# Patient Record
Sex: FEMALE | Race: WHITE | NOT HISPANIC OR LATINO | URBAN - NONMETROPOLITAN AREA
[De-identification: names, ages, dates, MRNs, and addresses within clinical notes are randomized per-mention and may not be internally consistent; named-entity substitution may affect disease eponyms.]

---

## 2018-04-30 ENCOUNTER — OFFICE VISIT CONVERTED (OUTPATIENT)
Dept: FAMILY MEDICINE CLINIC | Facility: CLINIC | Age: 28
End: 2018-04-30
Attending: NURSE PRACTITIONER

## 2020-01-02 ENCOUNTER — HOSPITAL ENCOUNTER (OUTPATIENT)
Dept: URGENT CARE | Facility: CLINIC | Age: 30
Discharge: HOME | End: 2020-01-02
Attending: EMERGENCY MEDICINE

## 2020-12-26 ENCOUNTER — HOSPITAL ENCOUNTER (OUTPATIENT)
Dept: URGENT CARE | Facility: CLINIC | Age: 30
Discharge: HOME | End: 2020-12-26

## 2020-12-27 LAB — SARS-COV-2 RNA SPEC QL NAA+PROBE: NOT DETECTED

## 2021-05-16 VITALS
RESPIRATION RATE: 18 BRPM | TEMPERATURE: 97.8 F | SYSTOLIC BLOOD PRESSURE: 116 MMHG | OXYGEN SATURATION: 98 % | WEIGHT: 196.44 LBS | BODY MASS INDEX: 29.09 KG/M2 | DIASTOLIC BLOOD PRESSURE: 68 MMHG | HEART RATE: 80 BPM | HEIGHT: 69 IN

## 2023-05-16 PROCEDURE — 87205 SMEAR GRAM STAIN: CPT | Performed by: STUDENT IN AN ORGANIZED HEALTH CARE EDUCATION/TRAINING PROGRAM

## 2023-05-16 PROCEDURE — 87070 CULTURE OTHR SPECIMN AEROBIC: CPT | Performed by: STUDENT IN AN ORGANIZED HEALTH CARE EDUCATION/TRAINING PROGRAM

## 2023-05-16 PROCEDURE — 87147 CULTURE TYPE IMMUNOLOGIC: CPT | Performed by: STUDENT IN AN ORGANIZED HEALTH CARE EDUCATION/TRAINING PROGRAM

## 2023-05-16 PROCEDURE — 87186 SC STD MICRODIL/AGAR DIL: CPT | Performed by: STUDENT IN AN ORGANIZED HEALTH CARE EDUCATION/TRAINING PROGRAM

## 2023-05-18 ENCOUNTER — TELEPHONE (OUTPATIENT)
Dept: URGENT CARE | Facility: CLINIC | Age: 33
End: 2023-05-18
Payer: COMMERCIAL

## 2023-05-22 ENCOUNTER — LAB (OUTPATIENT)
Dept: LAB | Facility: HOSPITAL | Age: 33
End: 2023-05-22
Payer: COMMERCIAL

## 2023-05-22 ENCOUNTER — OFFICE VISIT (OUTPATIENT)
Dept: FAMILY MEDICINE CLINIC | Facility: CLINIC | Age: 33
End: 2023-05-22
Payer: COMMERCIAL

## 2023-05-22 VITALS
WEIGHT: 195.2 LBS | BODY MASS INDEX: 28.91 KG/M2 | TEMPERATURE: 98.7 F | HEART RATE: 89 BPM | SYSTOLIC BLOOD PRESSURE: 137 MMHG | DIASTOLIC BLOOD PRESSURE: 84 MMHG | HEIGHT: 69 IN | OXYGEN SATURATION: 98 %

## 2023-05-22 DIAGNOSIS — M25.512 ACUTE PAIN OF LEFT SHOULDER: ICD-10-CM

## 2023-05-22 DIAGNOSIS — Z13.220 SCREENING FOR LIPID DISORDERS: ICD-10-CM

## 2023-05-22 DIAGNOSIS — Z13.29 SCREENING FOR THYROID DISORDER: ICD-10-CM

## 2023-05-22 DIAGNOSIS — F41.1 GAD (GENERALIZED ANXIETY DISORDER): ICD-10-CM

## 2023-05-22 DIAGNOSIS — E55.9 VITAMIN D DEFICIENCY: ICD-10-CM

## 2023-05-22 DIAGNOSIS — K21.9 GASTROESOPHAGEAL REFLUX DISEASE, UNSPECIFIED WHETHER ESOPHAGITIS PRESENT: ICD-10-CM

## 2023-05-22 DIAGNOSIS — Z22.322 MRSA (METHICILLIN RESISTANT STAPH AUREUS) CULTURE POSITIVE: ICD-10-CM

## 2023-05-22 DIAGNOSIS — F32.A DEPRESSION, UNSPECIFIED DEPRESSION TYPE: ICD-10-CM

## 2023-05-22 DIAGNOSIS — M19.90 ARTHRITIS: ICD-10-CM

## 2023-05-22 DIAGNOSIS — J30.9 ALLERGIC RHINITIS, UNSPECIFIED SEASONALITY, UNSPECIFIED TRIGGER: ICD-10-CM

## 2023-05-22 DIAGNOSIS — Z76.89 ESTABLISHING CARE WITH NEW DOCTOR, ENCOUNTER FOR: Primary | ICD-10-CM

## 2023-05-22 LAB
25(OH)D3 SERPL-MCNC: 65 NG/ML (ref 30–100)
BASOPHILS # BLD AUTO: 0.05 10*3/MM3 (ref 0–0.2)
BASOPHILS NFR BLD AUTO: 0.7 % (ref 0–1.5)
CHOLEST SERPL-MCNC: 187 MG/DL (ref 0–200)
DEPRECATED RDW RBC AUTO: 38.4 FL (ref 37–54)
EOSINOPHIL # BLD AUTO: 0.51 10*3/MM3 (ref 0–0.4)
EOSINOPHIL NFR BLD AUTO: 7.3 % (ref 0.3–6.2)
ERYTHROCYTE [DISTWIDTH] IN BLOOD BY AUTOMATED COUNT: 11.8 % (ref 12.3–15.4)
HCT VFR BLD AUTO: 38.9 % (ref 34–46.6)
HDLC SERPL-MCNC: 66 MG/DL (ref 40–60)
HGB BLD-MCNC: 13.4 G/DL (ref 12–15.9)
IMM GRANULOCYTES # BLD AUTO: 0.01 10*3/MM3 (ref 0–0.05)
IMM GRANULOCYTES NFR BLD AUTO: 0.1 % (ref 0–0.5)
LDLC SERPL CALC-MCNC: 110 MG/DL (ref 0–100)
LDLC/HDLC SERPL: 1.65 {RATIO}
LYMPHOCYTES # BLD AUTO: 2.27 10*3/MM3 (ref 0.7–3.1)
LYMPHOCYTES NFR BLD AUTO: 32.6 % (ref 19.6–45.3)
MCH RBC QN AUTO: 30.5 PG (ref 26.6–33)
MCHC RBC AUTO-ENTMCNC: 34.4 G/DL (ref 31.5–35.7)
MCV RBC AUTO: 88.4 FL (ref 79–97)
MONOCYTES # BLD AUTO: 0.61 10*3/MM3 (ref 0.1–0.9)
MONOCYTES NFR BLD AUTO: 8.8 % (ref 5–12)
NEUTROPHILS NFR BLD AUTO: 3.52 10*3/MM3 (ref 1.7–7)
NEUTROPHILS NFR BLD AUTO: 50.5 % (ref 42.7–76)
NRBC BLD AUTO-RTO: 0 /100 WBC (ref 0–0.2)
PLATELET # BLD AUTO: 381 10*3/MM3 (ref 140–450)
PMV BLD AUTO: 11.7 FL (ref 6–12)
RBC # BLD AUTO: 4.4 10*6/MM3 (ref 3.77–5.28)
TRIGL SERPL-MCNC: 60 MG/DL (ref 0–150)
TSH SERPL DL<=0.05 MIU/L-ACNC: 0.82 UIU/ML (ref 0.27–4.2)
VLDLC SERPL-MCNC: 11 MG/DL (ref 5–40)
WBC NRBC COR # BLD: 6.97 10*3/MM3 (ref 3.4–10.8)

## 2023-05-22 PROCEDURE — 82306 VITAMIN D 25 HYDROXY: CPT

## 2023-05-22 PROCEDURE — 36415 COLL VENOUS BLD VENIPUNCTURE: CPT

## 2023-05-22 PROCEDURE — 80061 LIPID PANEL: CPT

## 2023-05-22 PROCEDURE — 80050 GENERAL HEALTH PANEL: CPT

## 2023-05-22 RX ORDER — CETIRIZINE HYDROCHLORIDE 10 MG/1
10 TABLET ORAL DAILY
COMMUNITY

## 2023-05-22 RX ORDER — FLUCONAZOLE 150 MG/1
150 TABLET ORAL ONCE
COMMUNITY
Start: 2023-05-16

## 2023-05-22 RX ORDER — ALBUTEROL SULFATE 90 UG/1
2 AEROSOL, METERED RESPIRATORY (INHALATION) EVERY 4 HOURS PRN
COMMUNITY

## 2023-05-23 LAB
ALBUMIN SERPL-MCNC: 4.6 G/DL (ref 3.5–5.2)
ALBUMIN/GLOB SERPL: 1.8 G/DL
ALP SERPL-CCNC: 48 U/L (ref 39–117)
ALT SERPL W P-5'-P-CCNC: 15 U/L (ref 1–33)
ANION GAP SERPL CALCULATED.3IONS-SCNC: 9 MMOL/L (ref 5–15)
AST SERPL-CCNC: 23 U/L (ref 1–32)
BILIRUB SERPL-MCNC: 0.4 MG/DL (ref 0–1.2)
BUN SERPL-MCNC: 11 MG/DL (ref 6–20)
BUN/CREAT SERPL: 11 (ref 7–25)
CALCIUM SPEC-SCNC: 9.3 MG/DL (ref 8.6–10.5)
CHLORIDE SERPL-SCNC: 103 MMOL/L (ref 98–107)
CO2 SERPL-SCNC: 26 MMOL/L (ref 22–29)
CREAT SERPL-MCNC: 1 MG/DL (ref 0.57–1)
EGFRCR SERPLBLD CKD-EPI 2021: 76 ML/MIN/1.73
GLOBULIN UR ELPH-MCNC: 2.5 GM/DL
GLUCOSE SERPL-MCNC: 72 MG/DL (ref 65–99)
POTASSIUM SERPL-SCNC: 4.4 MMOL/L (ref 3.5–5.2)
PROT SERPL-MCNC: 7.1 G/DL (ref 6–8.5)
SODIUM SERPL-SCNC: 138 MMOL/L (ref 136–145)

## 2023-08-18 ENCOUNTER — CLINICAL SUPPORT (OUTPATIENT)
Dept: FAMILY MEDICINE CLINIC | Facility: CLINIC | Age: 33
End: 2023-08-18
Payer: COMMERCIAL

## 2023-08-18 ENCOUNTER — OFFICE VISIT (OUTPATIENT)
Dept: BEHAVIORAL HEALTH | Facility: CLINIC | Age: 33
End: 2023-08-18
Payer: COMMERCIAL

## 2023-08-18 VITALS
HEIGHT: 69 IN | WEIGHT: 191 LBS | DIASTOLIC BLOOD PRESSURE: 85 MMHG | BODY MASS INDEX: 28.29 KG/M2 | HEART RATE: 101 BPM | SYSTOLIC BLOOD PRESSURE: 127 MMHG

## 2023-08-18 DIAGNOSIS — J30.9 ALLERGIC RHINITIS, UNSPECIFIED SEASONALITY, UNSPECIFIED TRIGGER: Primary | ICD-10-CM

## 2023-08-18 DIAGNOSIS — F33.1 MODERATE EPISODE OF RECURRENT MAJOR DEPRESSIVE DISORDER: Primary | ICD-10-CM

## 2023-08-18 DIAGNOSIS — F41.1 GENERALIZED ANXIETY DISORDER: ICD-10-CM

## 2023-08-18 DIAGNOSIS — R41.840 CONCENTRATION DEFICIT: ICD-10-CM

## 2023-08-18 PROCEDURE — 95117 IMMUNOTHERAPY INJECTIONS: CPT | Performed by: NURSE PRACTITIONER

## 2023-08-18 RX ORDER — FLUOXETINE HYDROCHLORIDE 20 MG/1
20 CAPSULE ORAL DAILY
Qty: 30 CAPSULE | Refills: 2 | Status: SHIPPED | OUTPATIENT
Start: 2023-08-18 | End: 2024-08-17

## 2023-08-21 ENCOUNTER — CLINICAL SUPPORT (OUTPATIENT)
Dept: FAMILY MEDICINE CLINIC | Facility: CLINIC | Age: 33
End: 2023-08-21
Payer: COMMERCIAL

## 2023-08-21 DIAGNOSIS — J30.9 ALLERGIC RHINITIS, UNSPECIFIED SEASONALITY, UNSPECIFIED TRIGGER: Primary | ICD-10-CM

## 2023-08-21 PROCEDURE — 95117 IMMUNOTHERAPY INJECTIONS: CPT | Performed by: NURSE PRACTITIONER

## 2023-09-12 ENCOUNTER — CLINICAL SUPPORT (OUTPATIENT)
Dept: FAMILY MEDICINE CLINIC | Facility: CLINIC | Age: 33
End: 2023-09-12
Payer: COMMERCIAL

## 2023-09-12 DIAGNOSIS — J30.9 ALLERGIC RHINITIS, UNSPECIFIED SEASONALITY, UNSPECIFIED TRIGGER: Primary | ICD-10-CM

## 2023-09-12 PROCEDURE — 95117 IMMUNOTHERAPY INJECTIONS: CPT | Performed by: NURSE PRACTITIONER

## 2023-09-22 ENCOUNTER — CLINICAL SUPPORT (OUTPATIENT)
Dept: FAMILY MEDICINE CLINIC | Facility: CLINIC | Age: 33
End: 2023-09-22
Payer: COMMERCIAL

## 2023-09-22 DIAGNOSIS — J30.9 ALLERGIC RHINITIS, UNSPECIFIED SEASONALITY, UNSPECIFIED TRIGGER: Primary | ICD-10-CM

## 2023-09-22 PROCEDURE — 95117 IMMUNOTHERAPY INJECTIONS: CPT | Performed by: NURSE PRACTITIONER

## 2023-09-28 ENCOUNTER — OFFICE VISIT (OUTPATIENT)
Dept: OBSTETRICS AND GYNECOLOGY | Facility: CLINIC | Age: 33
End: 2023-09-28
Payer: COMMERCIAL

## 2023-09-28 ENCOUNTER — CLINICAL SUPPORT (OUTPATIENT)
Dept: FAMILY MEDICINE CLINIC | Facility: CLINIC | Age: 33
End: 2023-09-28
Payer: COMMERCIAL

## 2023-09-28 VITALS
BODY MASS INDEX: 26.66 KG/M2 | SYSTOLIC BLOOD PRESSURE: 120 MMHG | WEIGHT: 180 LBS | HEIGHT: 69 IN | HEART RATE: 97 BPM | DIASTOLIC BLOOD PRESSURE: 88 MMHG

## 2023-09-28 DIAGNOSIS — N94.6 DYSMENORRHEA: ICD-10-CM

## 2023-09-28 DIAGNOSIS — J30.9 ALLERGIC RHINITIS, UNSPECIFIED SEASONALITY, UNSPECIFIED TRIGGER: Primary | ICD-10-CM

## 2023-09-28 DIAGNOSIS — Z01.419 WELL WOMAN EXAM: Primary | ICD-10-CM

## 2023-09-28 DIAGNOSIS — N93.9 ABNORMAL UTERINE BLEEDING (AUB): ICD-10-CM

## 2023-09-28 LAB
B-HCG UR QL: NEGATIVE
C TRACH RRNA CVX QL NAA+PROBE: NOT DETECTED
CANDIDA SPECIES: NEGATIVE
EXPIRATION DATE: NORMAL
GARDNERELLA VAGINALIS: POSITIVE
INTERNAL NEGATIVE CONTROL: NORMAL
INTERNAL POSITIVE CONTROL: NORMAL
Lab: NORMAL
N GONORRHOEA RRNA SPEC QL NAA+PROBE: NOT DETECTED
T VAGINALIS DNA VAG QL PROBE+SIG AMP: NEGATIVE

## 2023-09-28 PROCEDURE — 87591 N.GONORRHOEAE DNA AMP PROB: CPT | Performed by: NURSE PRACTITIONER

## 2023-09-28 PROCEDURE — 95117 IMMUNOTHERAPY INJECTIONS: CPT | Performed by: FAMILY MEDICINE

## 2023-09-28 PROCEDURE — 87491 CHLMYD TRACH DNA AMP PROBE: CPT | Performed by: NURSE PRACTITIONER

## 2023-09-28 PROCEDURE — 87480 CANDIDA DNA DIR PROBE: CPT | Performed by: NURSE PRACTITIONER

## 2023-09-28 PROCEDURE — 87510 GARDNER VAG DNA DIR PROBE: CPT | Performed by: NURSE PRACTITIONER

## 2023-09-28 PROCEDURE — 87660 TRICHOMONAS VAGIN DIR PROBE: CPT | Performed by: NURSE PRACTITIONER

## 2023-09-29 ENCOUNTER — TELEPHONE (OUTPATIENT)
Dept: OBSTETRICS AND GYNECOLOGY | Facility: CLINIC | Age: 33
End: 2023-09-29
Payer: COMMERCIAL

## 2023-09-29 DIAGNOSIS — N76.0 BV (BACTERIAL VAGINOSIS): Primary | ICD-10-CM

## 2023-09-29 DIAGNOSIS — B96.89 BV (BACTERIAL VAGINOSIS): Primary | ICD-10-CM

## 2023-10-03 LAB
CYTOLOGIST CVX/VAG CYTO: NORMAL
CYTOLOGY CVX/VAG DOC CYTO: NORMAL
CYTOLOGY CVX/VAG DOC THIN PREP: NORMAL
DX ICD CODE: NORMAL
HIV 1 & 2 AB SER-IMP: NORMAL
HPV I/H RISK 4 DNA CVX QL PROBE+SIG AMP: NEGATIVE
OTHER STN SPEC: NORMAL
STAT OF ADQ CVX/VAG CYTO-IMP: NORMAL

## 2023-10-10 PROCEDURE — 87186 SC STD MICRODIL/AGAR DIL: CPT | Performed by: FAMILY MEDICINE

## 2023-10-10 PROCEDURE — 87086 URINE CULTURE/COLONY COUNT: CPT | Performed by: FAMILY MEDICINE

## 2023-10-10 PROCEDURE — 87077 CULTURE AEROBIC IDENTIFY: CPT | Performed by: FAMILY MEDICINE

## 2023-10-12 ENCOUNTER — TELEPHONE (OUTPATIENT)
Dept: URGENT CARE | Facility: CLINIC | Age: 33
End: 2023-10-12
Payer: COMMERCIAL

## 2023-10-12 ENCOUNTER — OFFICE VISIT (OUTPATIENT)
Dept: BEHAVIORAL HEALTH | Facility: CLINIC | Age: 33
End: 2023-10-12
Payer: COMMERCIAL

## 2023-10-12 ENCOUNTER — CLINICAL SUPPORT (OUTPATIENT)
Dept: FAMILY MEDICINE CLINIC | Facility: CLINIC | Age: 33
End: 2023-10-12
Payer: COMMERCIAL

## 2023-10-12 VITALS
HEART RATE: 89 BPM | BODY MASS INDEX: 27.02 KG/M2 | WEIGHT: 182.4 LBS | OXYGEN SATURATION: 99 % | SYSTOLIC BLOOD PRESSURE: 115 MMHG | DIASTOLIC BLOOD PRESSURE: 71 MMHG | HEIGHT: 69 IN

## 2023-10-12 DIAGNOSIS — F41.1 GENERALIZED ANXIETY DISORDER: ICD-10-CM

## 2023-10-12 DIAGNOSIS — F41.0 PANIC ATTACKS: ICD-10-CM

## 2023-10-12 DIAGNOSIS — R41.840 CONCENTRATION DEFICIT: ICD-10-CM

## 2023-10-12 DIAGNOSIS — J30.9 ALLERGIC RHINITIS, UNSPECIFIED SEASONALITY, UNSPECIFIED TRIGGER: Primary | ICD-10-CM

## 2023-10-12 DIAGNOSIS — F33.1 MODERATE EPISODE OF RECURRENT MAJOR DEPRESSIVE DISORDER: Primary | ICD-10-CM

## 2023-10-12 PROBLEM — F41.9 ANXIETY: Status: ACTIVE | Noted: 2022-05-26

## 2023-10-12 PROBLEM — M19.90 ARTHRITIS: Status: ACTIVE | Noted: 2023-10-12

## 2023-10-12 PROBLEM — J45.909 ASTHMA: Status: ACTIVE | Noted: 2022-05-26

## 2023-10-12 PROCEDURE — 95117 IMMUNOTHERAPY INJECTIONS: CPT | Performed by: NURSE PRACTITIONER

## 2023-10-12 RX ORDER — FLUCONAZOLE 150 MG/1
150 TABLET ORAL ONCE
COMMUNITY
Start: 2023-10-10

## 2023-10-12 RX ORDER — LORCASERIN HYDROCHLORIDE HEMIHYDRATE 20 MG/1
TABLET, FILM COATED, EXTENDED RELEASE ORAL EVERY 24 HOURS
COMMUNITY

## 2023-10-12 RX ORDER — PROPRANOLOL HYDROCHLORIDE 10 MG/1
TABLET ORAL
Qty: 180 TABLET | Refills: 0 | Status: SHIPPED | OUTPATIENT
Start: 2023-10-12

## 2023-10-12 RX ORDER — PROPRANOLOL HYDROCHLORIDE 10 MG/1
10 TABLET ORAL 2 TIMES DAILY PRN
Qty: 60 TABLET | Refills: 1 | Status: SHIPPED | OUTPATIENT
Start: 2023-10-12 | End: 2023-10-12

## 2023-10-14 ENCOUNTER — TELEPHONE (OUTPATIENT)
Dept: URGENT CARE | Facility: CLINIC | Age: 33
End: 2023-10-14
Payer: COMMERCIAL

## 2023-10-18 ENCOUNTER — CLINICAL SUPPORT (OUTPATIENT)
Dept: FAMILY MEDICINE CLINIC | Facility: CLINIC | Age: 33
End: 2023-10-18
Payer: COMMERCIAL

## 2023-10-18 DIAGNOSIS — J30.9 ALLERGIC RHINITIS, UNSPECIFIED SEASONALITY, UNSPECIFIED TRIGGER: Primary | ICD-10-CM

## 2023-10-18 PROCEDURE — 95117 IMMUNOTHERAPY INJECTIONS: CPT | Performed by: NURSE PRACTITIONER

## 2023-10-30 ENCOUNTER — HOSPITAL ENCOUNTER (OUTPATIENT)
Dept: ULTRASOUND IMAGING | Facility: HOSPITAL | Age: 33
Discharge: HOME | End: 2023-10-30
Admitting: NURSE PRACTITIONER
Payer: COMMERCIAL

## 2023-10-30 DIAGNOSIS — N93.9 ABNORMAL UTERINE BLEEDING (AUB): ICD-10-CM

## 2023-10-30 DIAGNOSIS — N94.6 DYSMENORRHEA: ICD-10-CM

## 2023-10-30 PROCEDURE — 76830 TRANSVAGINAL US NON-OB: CPT

## 2023-10-30 PROCEDURE — 76856 US EXAM PELVIC COMPLETE: CPT

## 2023-11-06 ENCOUNTER — OFFICE VISIT (OUTPATIENT)
Dept: OBSTETRICS AND GYNECOLOGY | Facility: CLINIC | Age: 33
End: 2023-11-06
Payer: COMMERCIAL

## 2023-11-06 VITALS
HEART RATE: 82 BPM | HEIGHT: 69 IN | SYSTOLIC BLOOD PRESSURE: 116 MMHG | BODY MASS INDEX: 26.51 KG/M2 | DIASTOLIC BLOOD PRESSURE: 80 MMHG | WEIGHT: 179 LBS

## 2023-11-06 DIAGNOSIS — N94.6 DYSMENORRHEA: Primary | ICD-10-CM

## 2023-11-06 DIAGNOSIS — N92.0 MENORRHAGIA WITH REGULAR CYCLE: ICD-10-CM

## 2023-11-06 DIAGNOSIS — Z30.014 ENCOUNTER FOR INITIAL PRESCRIPTION OF INTRAUTERINE CONTRACEPTIVE DEVICE (IUD): ICD-10-CM

## 2023-11-06 RX ORDER — TACROLIMUS 1 MG/G
OINTMENT TOPICAL
COMMUNITY
Start: 2023-10-18

## 2023-11-10 ENCOUNTER — OFFICE VISIT (OUTPATIENT)
Dept: BEHAVIORAL HEALTH | Facility: CLINIC | Age: 33
End: 2023-11-10
Payer: COMMERCIAL

## 2023-11-10 ENCOUNTER — CLINICAL SUPPORT (OUTPATIENT)
Dept: FAMILY MEDICINE CLINIC | Facility: CLINIC | Age: 33
End: 2023-11-10
Payer: COMMERCIAL

## 2023-11-10 VITALS
WEIGHT: 182.4 LBS | DIASTOLIC BLOOD PRESSURE: 69 MMHG | BODY MASS INDEX: 27.02 KG/M2 | SYSTOLIC BLOOD PRESSURE: 113 MMHG | OXYGEN SATURATION: 99 % | HEIGHT: 69 IN | HEART RATE: 87 BPM

## 2023-11-10 DIAGNOSIS — J30.9 ALLERGIC RHINITIS, UNSPECIFIED SEASONALITY, UNSPECIFIED TRIGGER: Primary | ICD-10-CM

## 2023-11-10 DIAGNOSIS — R41.840 CONCENTRATION DEFICIT: ICD-10-CM

## 2023-11-10 DIAGNOSIS — F41.1 GENERALIZED ANXIETY DISORDER: ICD-10-CM

## 2023-11-10 DIAGNOSIS — F41.0 PANIC ATTACKS: ICD-10-CM

## 2023-11-10 DIAGNOSIS — F33.1 MODERATE EPISODE OF RECURRENT MAJOR DEPRESSIVE DISORDER: Primary | ICD-10-CM

## 2023-11-10 PROCEDURE — 95117 IMMUNOTHERAPY INJECTIONS: CPT | Performed by: NURSE PRACTITIONER

## 2023-11-16 ENCOUNTER — OFFICE VISIT (OUTPATIENT)
Dept: BEHAVIORAL HEALTH | Facility: CLINIC | Age: 33
End: 2023-11-16
Payer: COMMERCIAL

## 2023-11-16 ENCOUNTER — CLINICAL SUPPORT (OUTPATIENT)
Dept: FAMILY MEDICINE CLINIC | Facility: CLINIC | Age: 33
End: 2023-11-16
Payer: COMMERCIAL

## 2023-11-16 VITALS
BODY MASS INDEX: 26.75 KG/M2 | HEART RATE: 80 BPM | WEIGHT: 180.6 LBS | SYSTOLIC BLOOD PRESSURE: 119 MMHG | DIASTOLIC BLOOD PRESSURE: 77 MMHG | HEIGHT: 69 IN | OXYGEN SATURATION: 98 %

## 2023-11-16 DIAGNOSIS — F33.1 MODERATE EPISODE OF RECURRENT MAJOR DEPRESSIVE DISORDER: Primary | ICD-10-CM

## 2023-11-16 DIAGNOSIS — F41.0 PANIC ATTACKS: ICD-10-CM

## 2023-11-16 DIAGNOSIS — F41.9 ANXIETY: ICD-10-CM

## 2023-11-16 DIAGNOSIS — Z79.899 MEDICATION MANAGEMENT: ICD-10-CM

## 2023-11-16 DIAGNOSIS — F32.A DEPRESSION, UNSPECIFIED DEPRESSION TYPE: ICD-10-CM

## 2023-11-16 DIAGNOSIS — F90.0 ATTENTION DEFICIT HYPERACTIVITY DISORDER (ADHD), PREDOMINANTLY INATTENTIVE TYPE: ICD-10-CM

## 2023-11-16 DIAGNOSIS — F41.1 GENERALIZED ANXIETY DISORDER: ICD-10-CM

## 2023-11-16 DIAGNOSIS — J30.9 ALLERGIC RHINITIS, UNSPECIFIED SEASONALITY, UNSPECIFIED TRIGGER: Primary | ICD-10-CM

## 2023-11-16 LAB
POC AMPHETAMINES: NEGATIVE
POC BARBITURATES: NEGATIVE
POC BENZODIAZEPHINES: NEGATIVE
POC COCAINE: NEGATIVE
POC METHADONE: NEGATIVE
POC METHAMPHETAMINE SCREEN URINE: NEGATIVE
POC OPIATES: NEGATIVE
POC OXYCODONE: NEGATIVE
POC PHENCYCLIDINE: NEGATIVE
POC PROPOXYPHENE: NEGATIVE
POC THC: NEGATIVE
POC TRICYCLIC ANTIDEPRESSANTS: NEGATIVE

## 2023-11-16 PROCEDURE — 80305 DRUG TEST PRSMV DIR OPT OBS: CPT | Performed by: NURSE PRACTITIONER

## 2023-11-16 PROCEDURE — 95117 IMMUNOTHERAPY INJECTIONS: CPT | Performed by: NURSE PRACTITIONER

## 2023-11-16 RX ORDER — DEXTROAMPHETAMINE SACCHARATE, AMPHETAMINE ASPARTATE MONOHYDRATE, DEXTROAMPHETAMINE SULFATE AND AMPHETAMINE SULFATE 2.5; 2.5; 2.5; 2.5 MG/1; MG/1; MG/1; MG/1
10 CAPSULE, EXTENDED RELEASE ORAL DAILY
Qty: 30 CAPSULE | Refills: 0 | Status: SHIPPED | OUTPATIENT
Start: 2023-11-16 | End: 2024-11-15

## 2023-11-30 ENCOUNTER — LAB (OUTPATIENT)
Dept: OBSTETRICS AND GYNECOLOGY | Facility: CLINIC | Age: 33
End: 2023-11-30
Payer: COMMERCIAL

## 2023-11-30 LAB — HCG INTACT+B SERPL-ACNC: <0.5 MIU/ML

## 2023-11-30 PROCEDURE — 84702 CHORIONIC GONADOTROPIN TEST: CPT | Performed by: NURSE PRACTITIONER

## 2023-12-01 ENCOUNTER — CLINICAL SUPPORT (OUTPATIENT)
Dept: FAMILY MEDICINE CLINIC | Facility: CLINIC | Age: 33
End: 2023-12-01
Payer: COMMERCIAL

## 2023-12-01 ENCOUNTER — PROCEDURE VISIT (OUTPATIENT)
Dept: OBSTETRICS AND GYNECOLOGY | Facility: CLINIC | Age: 33
End: 2023-12-01
Payer: COMMERCIAL

## 2023-12-01 VITALS
SYSTOLIC BLOOD PRESSURE: 114 MMHG | WEIGHT: 176 LBS | BODY MASS INDEX: 26.07 KG/M2 | HEIGHT: 69 IN | HEART RATE: 76 BPM | DIASTOLIC BLOOD PRESSURE: 78 MMHG

## 2023-12-01 DIAGNOSIS — Z30.430 ENCOUNTER FOR IUD INSERTION: Primary | ICD-10-CM

## 2023-12-01 DIAGNOSIS — J30.9 ALLERGIC RHINITIS, UNSPECIFIED SEASONALITY, UNSPECIFIED TRIGGER: Primary | ICD-10-CM

## 2023-12-01 PROCEDURE — 95117 IMMUNOTHERAPY INJECTIONS: CPT | Performed by: NURSE PRACTITIONER

## 2023-12-14 ENCOUNTER — CLINICAL SUPPORT (OUTPATIENT)
Dept: FAMILY MEDICINE CLINIC | Facility: CLINIC | Age: 33
End: 2023-12-14
Payer: COMMERCIAL

## 2023-12-14 ENCOUNTER — OFFICE VISIT (OUTPATIENT)
Dept: BEHAVIORAL HEALTH | Facility: CLINIC | Age: 33
End: 2023-12-14
Payer: COMMERCIAL

## 2023-12-14 VITALS
HEART RATE: 84 BPM | SYSTOLIC BLOOD PRESSURE: 113 MMHG | HEIGHT: 69 IN | OXYGEN SATURATION: 100 % | BODY MASS INDEX: 26.51 KG/M2 | DIASTOLIC BLOOD PRESSURE: 69 MMHG | WEIGHT: 179 LBS

## 2023-12-14 DIAGNOSIS — J30.9 ALLERGIC RHINITIS, UNSPECIFIED SEASONALITY, UNSPECIFIED TRIGGER: Primary | ICD-10-CM

## 2023-12-14 DIAGNOSIS — F41.1 GENERALIZED ANXIETY DISORDER: ICD-10-CM

## 2023-12-14 DIAGNOSIS — F41.0 PANIC ATTACKS: ICD-10-CM

## 2023-12-14 DIAGNOSIS — F90.0 ATTENTION DEFICIT HYPERACTIVITY DISORDER (ADHD), PREDOMINANTLY INATTENTIVE TYPE: Primary | ICD-10-CM

## 2023-12-14 DIAGNOSIS — F33.1 MODERATE EPISODE OF RECURRENT MAJOR DEPRESSIVE DISORDER: ICD-10-CM

## 2023-12-14 PROCEDURE — 95117 IMMUNOTHERAPY INJECTIONS: CPT | Performed by: NURSE PRACTITIONER

## 2023-12-14 RX ORDER — DEXTROAMPHETAMINE SACCHARATE, AMPHETAMINE ASPARTATE MONOHYDRATE, DEXTROAMPHETAMINE SULFATE AND AMPHETAMINE SULFATE 3.75; 3.75; 3.75; 3.75 MG/1; MG/1; MG/1; MG/1
15 CAPSULE, EXTENDED RELEASE ORAL DAILY
Qty: 30 CAPSULE | Refills: 0 | Status: SHIPPED | OUTPATIENT
Start: 2023-12-14 | End: 2024-12-13

## 2023-12-19 ENCOUNTER — CLINICAL SUPPORT (OUTPATIENT)
Dept: FAMILY MEDICINE CLINIC | Facility: CLINIC | Age: 33
End: 2023-12-19
Payer: COMMERCIAL

## 2023-12-19 DIAGNOSIS — J30.9 ALLERGIC RHINITIS, UNSPECIFIED SEASONALITY, UNSPECIFIED TRIGGER: Primary | ICD-10-CM

## 2023-12-19 PROCEDURE — 95117 IMMUNOTHERAPY INJECTIONS: CPT | Performed by: NURSE PRACTITIONER

## 2024-01-08 ENCOUNTER — OFFICE VISIT (OUTPATIENT)
Dept: OBSTETRICS AND GYNECOLOGY | Facility: CLINIC | Age: 34
End: 2024-01-08
Payer: COMMERCIAL

## 2024-01-08 VITALS
HEIGHT: 69 IN | DIASTOLIC BLOOD PRESSURE: 86 MMHG | HEART RATE: 82 BPM | SYSTOLIC BLOOD PRESSURE: 122 MMHG | WEIGHT: 183 LBS | BODY MASS INDEX: 27.11 KG/M2

## 2024-01-08 DIAGNOSIS — Z30.431 ENCOUNTER FOR ROUTINE CHECKING OF INTRAUTERINE CONTRACEPTIVE DEVICE (IUD): Primary | ICD-10-CM

## 2024-01-08 PROCEDURE — 99213 OFFICE O/P EST LOW 20 MIN: CPT | Performed by: OBSTETRICS & GYNECOLOGY

## 2024-01-11 ENCOUNTER — CLINICAL SUPPORT (OUTPATIENT)
Dept: FAMILY MEDICINE CLINIC | Facility: CLINIC | Age: 34
End: 2024-01-11
Payer: COMMERCIAL

## 2024-01-11 ENCOUNTER — OFFICE VISIT (OUTPATIENT)
Dept: BEHAVIORAL HEALTH | Facility: CLINIC | Age: 34
End: 2024-01-11
Payer: COMMERCIAL

## 2024-01-11 VITALS
SYSTOLIC BLOOD PRESSURE: 118 MMHG | BODY MASS INDEX: 26.97 KG/M2 | HEART RATE: 79 BPM | WEIGHT: 182.13 LBS | HEIGHT: 69 IN | DIASTOLIC BLOOD PRESSURE: 69 MMHG | OXYGEN SATURATION: 99 %

## 2024-01-11 DIAGNOSIS — R41.840 CONCENTRATION DEFICIT: ICD-10-CM

## 2024-01-11 DIAGNOSIS — J30.9 ALLERGIC RHINITIS, UNSPECIFIED SEASONALITY, UNSPECIFIED TRIGGER: Primary | ICD-10-CM

## 2024-01-11 DIAGNOSIS — F41.0 PANIC ATTACKS: ICD-10-CM

## 2024-01-11 DIAGNOSIS — F41.1 GENERALIZED ANXIETY DISORDER: ICD-10-CM

## 2024-01-11 DIAGNOSIS — F90.0 ATTENTION DEFICIT HYPERACTIVITY DISORDER (ADHD), PREDOMINANTLY INATTENTIVE TYPE: Primary | ICD-10-CM

## 2024-01-11 DIAGNOSIS — F33.1 MODERATE EPISODE OF RECURRENT MAJOR DEPRESSIVE DISORDER: ICD-10-CM

## 2024-01-11 PROCEDURE — 95117 IMMUNOTHERAPY INJECTIONS: CPT | Performed by: NURSE PRACTITIONER

## 2024-01-11 RX ORDER — DEXTROAMPHETAMINE SACCHARATE, AMPHETAMINE ASPARTATE MONOHYDRATE, DEXTROAMPHETAMINE SULFATE AND AMPHETAMINE SULFATE 5; 5; 5; 5 MG/1; MG/1; MG/1; MG/1
20 CAPSULE, EXTENDED RELEASE ORAL EVERY MORNING
Qty: 30 CAPSULE | Refills: 0 | Status: SHIPPED | OUTPATIENT
Start: 2024-01-12

## 2024-02-15 ENCOUNTER — CLINICAL SUPPORT (OUTPATIENT)
Dept: FAMILY MEDICINE CLINIC | Facility: CLINIC | Age: 34
End: 2024-02-15
Payer: COMMERCIAL

## 2024-02-15 ENCOUNTER — OFFICE VISIT (OUTPATIENT)
Dept: BEHAVIORAL HEALTH | Facility: CLINIC | Age: 34
End: 2024-02-15
Payer: COMMERCIAL

## 2024-02-15 VITALS
WEIGHT: 182.38 LBS | DIASTOLIC BLOOD PRESSURE: 82 MMHG | OXYGEN SATURATION: 97 % | SYSTOLIC BLOOD PRESSURE: 124 MMHG | HEIGHT: 69 IN | HEART RATE: 104 BPM | BODY MASS INDEX: 27.01 KG/M2

## 2024-02-15 DIAGNOSIS — F90.0 ATTENTION DEFICIT HYPERACTIVITY DISORDER (ADHD), PREDOMINANTLY INATTENTIVE TYPE: Primary | ICD-10-CM

## 2024-02-15 DIAGNOSIS — F41.1 GENERALIZED ANXIETY DISORDER: ICD-10-CM

## 2024-02-15 DIAGNOSIS — F33.1 MODERATE EPISODE OF RECURRENT MAJOR DEPRESSIVE DISORDER: ICD-10-CM

## 2024-02-15 DIAGNOSIS — F41.0 PANIC ATTACKS: ICD-10-CM

## 2024-02-15 DIAGNOSIS — J30.9 ALLERGIC RHINITIS, UNSPECIFIED SEASONALITY, UNSPECIFIED TRIGGER: Primary | ICD-10-CM

## 2024-02-15 PROCEDURE — 95117 IMMUNOTHERAPY INJECTIONS: CPT | Performed by: NURSE PRACTITIONER

## 2024-02-15 RX ORDER — FLUCONAZOLE 150 MG/1
150 TABLET ORAL
COMMUNITY
Start: 2024-02-03 | End: 2024-02-15

## 2024-02-15 RX ORDER — DEXTROAMPHETAMINE SACCHARATE, AMPHETAMINE ASPARTATE MONOHYDRATE, DEXTROAMPHETAMINE SULFATE AND AMPHETAMINE SULFATE 6.25; 6.25; 6.25; 6.25 MG/1; MG/1; MG/1; MG/1
25 CAPSULE, EXTENDED RELEASE ORAL DAILY
Qty: 30 CAPSULE | Refills: 0 | Status: SHIPPED | OUTPATIENT
Start: 2024-02-15 | End: 2025-02-14

## 2024-02-28 DIAGNOSIS — F33.1 MODERATE EPISODE OF RECURRENT MAJOR DEPRESSIVE DISORDER: ICD-10-CM

## 2024-02-28 DIAGNOSIS — F41.1 GENERALIZED ANXIETY DISORDER: ICD-10-CM

## 2024-02-28 DIAGNOSIS — R41.840 CONCENTRATION DEFICIT: ICD-10-CM

## 2024-02-28 RX ORDER — VORTIOXETINE 20 MG/1
1 TABLET, FILM COATED ORAL
Qty: 30 TABLET | Refills: 1 | Status: SHIPPED | OUTPATIENT
Start: 2024-02-28

## 2024-03-21 ENCOUNTER — CLINICAL SUPPORT (OUTPATIENT)
Dept: FAMILY MEDICINE CLINIC | Facility: CLINIC | Age: 34
End: 2024-03-21
Payer: COMMERCIAL

## 2024-03-21 ENCOUNTER — OFFICE VISIT (OUTPATIENT)
Dept: BEHAVIORAL HEALTH | Facility: CLINIC | Age: 34
End: 2024-03-21
Payer: COMMERCIAL

## 2024-03-21 VITALS
SYSTOLIC BLOOD PRESSURE: 112 MMHG | HEIGHT: 69 IN | OXYGEN SATURATION: 100 % | DIASTOLIC BLOOD PRESSURE: 74 MMHG | WEIGHT: 188 LBS | BODY MASS INDEX: 27.85 KG/M2 | HEART RATE: 79 BPM

## 2024-03-21 DIAGNOSIS — F41.0 PANIC ATTACKS: ICD-10-CM

## 2024-03-21 DIAGNOSIS — F90.0 ATTENTION DEFICIT HYPERACTIVITY DISORDER (ADHD), PREDOMINANTLY INATTENTIVE TYPE: ICD-10-CM

## 2024-03-21 DIAGNOSIS — F41.1 GENERALIZED ANXIETY DISORDER: ICD-10-CM

## 2024-03-21 DIAGNOSIS — J30.9 ALLERGIC RHINITIS, UNSPECIFIED SEASONALITY, UNSPECIFIED TRIGGER: Primary | ICD-10-CM

## 2024-03-21 DIAGNOSIS — R41.840 CONCENTRATION DEFICIT: ICD-10-CM

## 2024-03-21 DIAGNOSIS — F33.1 MODERATE EPISODE OF RECURRENT MAJOR DEPRESSIVE DISORDER: Primary | ICD-10-CM

## 2024-03-21 RX ORDER — DEXTROAMPHETAMINE SACCHARATE, AMPHETAMINE ASPARTATE MONOHYDRATE, DEXTROAMPHETAMINE SULFATE AND AMPHETAMINE SULFATE 6.25; 6.25; 6.25; 6.25 MG/1; MG/1; MG/1; MG/1
25 CAPSULE, EXTENDED RELEASE ORAL DAILY
Qty: 30 CAPSULE | Refills: 0 | Status: SHIPPED | OUTPATIENT
Start: 2024-03-21 | End: 2025-03-21

## 2024-03-21 RX ORDER — VORTIOXETINE 20 MG/1
1 TABLET, FILM COATED ORAL
Qty: 30 TABLET | Refills: 1 | Status: SHIPPED | OUTPATIENT
Start: 2024-03-21

## 2024-04-23 ENCOUNTER — APPOINTMENT (OUTPATIENT)
Dept: CT IMAGING | Facility: HOSPITAL | Age: 34
End: 2024-04-23
Payer: COMMERCIAL

## 2024-04-23 ENCOUNTER — APPOINTMENT (OUTPATIENT)
Dept: GENERAL RADIOLOGY | Facility: HOSPITAL | Age: 34
End: 2024-04-23
Payer: COMMERCIAL

## 2024-04-23 LAB
ALBUMIN SERPL-MCNC: 4.4 G/DL (ref 3.5–5.2)
ALBUMIN/GLOB SERPL: 1.6 G/DL
ALP SERPL-CCNC: 50 U/L (ref 39–117)
ALT SERPL W P-5'-P-CCNC: 13 U/L (ref 1–33)
ANION GAP SERPL CALCULATED.3IONS-SCNC: 11 MMOL/L (ref 5–15)
AST SERPL-CCNC: 18 U/L (ref 1–32)
BASOPHILS # BLD AUTO: 0.05 10*3/MM3 (ref 0–0.2)
BASOPHILS NFR BLD AUTO: 0.5 % (ref 0–1.5)
BILIRUB SERPL-MCNC: 1.7 MG/DL (ref 0–1.2)
BILIRUB UR QL STRIP: NEGATIVE
BUN SERPL-MCNC: 9 MG/DL (ref 6–20)
BUN/CREAT SERPL: 10 (ref 7–25)
CALCIUM SPEC-SCNC: 9.3 MG/DL (ref 8.6–10.5)
CHLORIDE SERPL-SCNC: 97 MMOL/L (ref 98–107)
CLARITY UR: CLEAR
CO2 SERPL-SCNC: 24 MMOL/L (ref 22–29)
COLOR UR: YELLOW
CREAT SERPL-MCNC: 0.9 MG/DL (ref 0.57–1)
DEPRECATED RDW RBC AUTO: 39.7 FL (ref 37–54)
EGFRCR SERPLBLD CKD-EPI 2021: 85.7 ML/MIN/1.73
EOSINOPHIL # BLD AUTO: 0.25 10*3/MM3 (ref 0–0.4)
EOSINOPHIL NFR BLD AUTO: 2.5 % (ref 0.3–6.2)
ERYTHROCYTE [DISTWIDTH] IN BLOOD BY AUTOMATED COUNT: 11.9 % (ref 12.3–15.4)
GLOBULIN UR ELPH-MCNC: 2.8 GM/DL
GLUCOSE SERPL-MCNC: 96 MG/DL (ref 65–99)
GLUCOSE UR STRIP-MCNC: NEGATIVE MG/DL
HCG INTACT+B SERPL-ACNC: <0.5 MIU/ML
HCT VFR BLD AUTO: 43 % (ref 34–46.6)
HGB BLD-MCNC: 14.1 G/DL (ref 12–15.9)
HGB UR QL STRIP.AUTO: NEGATIVE
IMM GRANULOCYTES # BLD AUTO: 0.03 10*3/MM3 (ref 0–0.05)
IMM GRANULOCYTES NFR BLD AUTO: 0.3 % (ref 0–0.5)
KETONES UR QL STRIP: NEGATIVE
LEUKOCYTE ESTERASE UR QL STRIP.AUTO: NEGATIVE
LYMPHOCYTES # BLD AUTO: 3.26 10*3/MM3 (ref 0.7–3.1)
LYMPHOCYTES NFR BLD AUTO: 33.1 % (ref 19.6–45.3)
MCH RBC QN AUTO: 29.9 PG (ref 26.6–33)
MCHC RBC AUTO-ENTMCNC: 32.8 G/DL (ref 31.5–35.7)
MCV RBC AUTO: 91.3 FL (ref 79–97)
MONOCYTES # BLD AUTO: 0.76 10*3/MM3 (ref 0.1–0.9)
MONOCYTES NFR BLD AUTO: 7.7 % (ref 5–12)
NEUTROPHILS NFR BLD AUTO: 5.51 10*3/MM3 (ref 1.7–7)
NEUTROPHILS NFR BLD AUTO: 55.9 % (ref 42.7–76)
NITRITE UR QL STRIP: NEGATIVE
NRBC BLD AUTO-RTO: 0 /100 WBC (ref 0–0.2)
PH UR STRIP.AUTO: 6.5 [PH] (ref 5–8)
PLATELET # BLD AUTO: 298 10*3/MM3 (ref 140–450)
PMV BLD AUTO: 10.9 FL (ref 6–12)
POTASSIUM SERPL-SCNC: 4 MMOL/L (ref 3.5–5.2)
PROT SERPL-MCNC: 7.2 G/DL (ref 6–8.5)
PROT UR QL STRIP: NEGATIVE
RBC # BLD AUTO: 4.71 10*6/MM3 (ref 3.77–5.28)
SODIUM SERPL-SCNC: 132 MMOL/L (ref 136–145)
SP GR UR STRIP: >1.03 (ref 1–1.03)
UROBILINOGEN UR QL STRIP: ABNORMAL
WBC NRBC COR # BLD AUTO: 9.86 10*3/MM3 (ref 3.4–10.8)
WHOLE BLOOD HOLD COAG: NORMAL

## 2024-04-23 PROCEDURE — 25510000001 IOPAMIDOL PER 1 ML: Performed by: EMERGENCY MEDICINE

## 2024-04-23 PROCEDURE — 74177 CT ABD & PELVIS W/CONTRAST: CPT

## 2024-04-23 PROCEDURE — 99285 EMERGENCY DEPT VISIT HI MDM: CPT

## 2024-04-23 PROCEDURE — 81003 URINALYSIS AUTO W/O SCOPE: CPT

## 2024-04-23 PROCEDURE — 84702 CHORIONIC GONADOTROPIN TEST: CPT

## 2024-04-23 PROCEDURE — 73590 X-RAY EXAM OF LOWER LEG: CPT

## 2024-04-23 PROCEDURE — 73130 X-RAY EXAM OF HAND: CPT

## 2024-04-23 PROCEDURE — 85025 COMPLETE CBC W/AUTO DIFF WBC: CPT

## 2024-04-23 PROCEDURE — 71045 X-RAY EXAM CHEST 1 VIEW: CPT

## 2024-04-23 PROCEDURE — 80053 COMPREHEN METABOLIC PANEL: CPT

## 2024-04-23 PROCEDURE — 36415 COLL VENOUS BLD VENIPUNCTURE: CPT

## 2024-04-23 PROCEDURE — 70450 CT HEAD/BRAIN W/O DYE: CPT

## 2024-04-23 RX ADMIN — IOPAMIDOL 80 ML: 755 INJECTION, SOLUTION INTRAVENOUS at 22:34

## 2024-04-24 ENCOUNTER — HOSPITAL ENCOUNTER (EMERGENCY)
Facility: HOSPITAL | Age: 34
Discharge: HOME | End: 2024-04-24
Attending: EMERGENCY MEDICINE
Payer: COMMERCIAL

## 2024-04-24 VITALS
OXYGEN SATURATION: 98 % | DIASTOLIC BLOOD PRESSURE: 77 MMHG | TEMPERATURE: 99.6 F | RESPIRATION RATE: 10 BRPM | BODY MASS INDEX: 26.94 KG/M2 | HEART RATE: 78 BPM | SYSTOLIC BLOOD PRESSURE: 117 MMHG | HEIGHT: 69 IN | WEIGHT: 181.88 LBS

## 2024-04-24 DIAGNOSIS — R10.30 LOWER ABDOMINAL PAIN: ICD-10-CM

## 2024-04-24 DIAGNOSIS — S06.0X9A CONCUSSION WITH LOSS OF CONSCIOUSNESS, INITIAL ENCOUNTER: ICD-10-CM
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
## 2024-04-24 DIAGNOSIS — S60.222A CONTUSION OF LEFT HAND, INITIAL ENCOUNTER: ICD-10-CM

## 2024-04-24 DIAGNOSIS — R53.83 OTHER FATIGUE: ICD-10-CM

## 2024-04-24 DIAGNOSIS — N83.202 LEFT OVARIAN CYST: ICD-10-CM

## 2024-04-24 DIAGNOSIS — S09.90XA TRAUMATIC INJURY OF HEAD, INITIAL ENCOUNTER: Primary | ICD-10-CM

## 2024-04-24 PROCEDURE — 96374 THER/PROPH/DIAG INJ IV PUSH: CPT

## 2024-04-24 PROCEDURE — 25010000002 KETOROLAC TROMETHAMINE PER 15 MG: Performed by: NURSE PRACTITIONER

## 2024-04-24 RX ORDER — DICLOFENAC SODIUM 75 MG/1
75 TABLET, DELAYED RELEASE ORAL 2 TIMES DAILY
Qty: 20 TABLET | Refills: 0 | Status: SHIPPED | OUTPATIENT
Start: 2024-04-24

## 2024-04-24 RX ORDER — KETOROLAC TROMETHAMINE 30 MG/ML
30 INJECTION, SOLUTION INTRAMUSCULAR; INTRAVENOUS ONCE
Status: COMPLETED | OUTPATIENT
Start: 2024-04-24 | End: 2024-04-24

## 2024-04-24 RX ORDER — METHOCARBAMOL 750 MG/1
750 TABLET, FILM COATED ORAL 3 TIMES DAILY PRN
Qty: 20 TABLET | Refills: 0 | Status: SHIPPED | OUTPATIENT
Start: 2024-04-24

## 2024-04-24 RX ADMIN — KETOROLAC TROMETHAMINE 30 MG: 30 INJECTION, SOLUTION INTRAMUSCULAR; INTRAVENOUS at 04:59

## 2024-04-30 ENCOUNTER — OFFICE VISIT (OUTPATIENT)
Dept: FAMILY MEDICINE CLINIC | Facility: CLINIC | Age: 34
End: 2024-04-30
Payer: COMMERCIAL

## 2024-04-30 VITALS
WEIGHT: 182.4 LBS | OXYGEN SATURATION: 100 % | HEIGHT: 69 IN | TEMPERATURE: 97.7 F | SYSTOLIC BLOOD PRESSURE: 122 MMHG | RESPIRATION RATE: 18 BRPM | DIASTOLIC BLOOD PRESSURE: 65 MMHG | HEART RATE: 75 BPM | BODY MASS INDEX: 27.02 KG/M2

## 2024-04-30 DIAGNOSIS — F41.1 GAD (GENERALIZED ANXIETY DISORDER): ICD-10-CM

## 2024-04-30 DIAGNOSIS — F32.A DEPRESSION, UNSPECIFIED DEPRESSION TYPE: ICD-10-CM

## 2024-04-30 DIAGNOSIS — J30.9 ALLERGIC RHINITIS, UNSPECIFIED SEASONALITY, UNSPECIFIED TRIGGER: ICD-10-CM

## 2024-04-30 DIAGNOSIS — S06.0X9A CONCUSSION WITH LOSS OF CONSCIOUSNESS, INITIAL ENCOUNTER: ICD-10-CM
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
## 2024-04-30 PROBLEM — F41.9 ANXIETY: Status: RESOLVED | Noted: 2022-05-26 | Resolved: 2024-04-30

## 2024-04-30 PROCEDURE — 99214 OFFICE O/P EST MOD 30 MIN: CPT | Performed by: NURSE PRACTITIONER

## 2024-04-30 PROCEDURE — 95117 IMMUNOTHERAPY INJECTIONS: CPT | Performed by: NURSE PRACTITIONER

## 2024-05-01 DIAGNOSIS — F90.0 ATTENTION DEFICIT HYPERACTIVITY DISORDER (ADHD), PREDOMINANTLY INATTENTIVE TYPE: ICD-10-CM

## 2024-05-02 ENCOUNTER — CLINICAL SUPPORT (OUTPATIENT)
Dept: FAMILY MEDICINE CLINIC | Facility: CLINIC | Age: 34
End: 2024-05-02
Payer: COMMERCIAL

## 2024-05-02 DIAGNOSIS — J30.9 ALLERGIC RHINITIS, UNSPECIFIED SEASONALITY, UNSPECIFIED TRIGGER: Primary | ICD-10-CM

## 2024-05-02 PROCEDURE — 95115 IMMUNOTHERAPY ONE INJECTION: CPT | Performed by: NURSE PRACTITIONER

## 2024-05-02 RX ORDER — DEXTROAMPHETAMINE SACCHARATE, AMPHETAMINE ASPARTATE MONOHYDRATE, DEXTROAMPHETAMINE SULFATE AND AMPHETAMINE SULFATE 6.25; 6.25; 6.25; 6.25 MG/1; MG/1; MG/1; MG/1
25 CAPSULE, EXTENDED RELEASE ORAL DAILY
Qty: 30 CAPSULE | Refills: 0 | Status: SHIPPED | OUTPATIENT
Start: 2024-05-02 | End: 2025-05-02

## 2024-05-30 ENCOUNTER — OFFICE VISIT (OUTPATIENT)
Dept: BEHAVIORAL HEALTH | Facility: CLINIC | Age: 34
End: 2024-05-30
Payer: COMMERCIAL

## 2024-05-30 ENCOUNTER — CLINICAL SUPPORT (OUTPATIENT)
Dept: FAMILY MEDICINE CLINIC | Facility: CLINIC | Age: 34
End: 2024-05-30
Payer: COMMERCIAL

## 2024-05-30 VITALS
HEIGHT: 69 IN | BODY MASS INDEX: 26.96 KG/M2 | OXYGEN SATURATION: 100 % | SYSTOLIC BLOOD PRESSURE: 126 MMHG | DIASTOLIC BLOOD PRESSURE: 80 MMHG | WEIGHT: 182 LBS | HEART RATE: 108 BPM

## 2024-05-30 DIAGNOSIS — R41.840 CONCENTRATION DEFICIT: ICD-10-CM

## 2024-05-30 DIAGNOSIS — F90.0 ATTENTION DEFICIT HYPERACTIVITY DISORDER (ADHD), PREDOMINANTLY INATTENTIVE TYPE: ICD-10-CM

## 2024-05-30 DIAGNOSIS — Z79.899 MEDICATION MANAGEMENT: ICD-10-CM

## 2024-05-30 DIAGNOSIS — F41.1 GENERALIZED ANXIETY DISORDER: ICD-10-CM

## 2024-05-30 DIAGNOSIS — F41.0 PANIC ATTACKS: ICD-10-CM

## 2024-05-30 DIAGNOSIS — J30.9 ALLERGIC RHINITIS, UNSPECIFIED SEASONALITY, UNSPECIFIED TRIGGER: Primary | ICD-10-CM

## 2024-05-30 DIAGNOSIS — F33.1 MODERATE EPISODE OF RECURRENT MAJOR DEPRESSIVE DISORDER: Primary | ICD-10-CM

## 2024-05-30 PROCEDURE — 95117 IMMUNOTHERAPY INJECTIONS: CPT | Performed by: NURSE PRACTITIONER

## 2024-05-30 RX ORDER — VORTIOXETINE 20 MG/1
1 TABLET, FILM COATED ORAL
Qty: 30 TABLET | Refills: 1 | Status: SHIPPED | OUTPATIENT
Start: 2024-05-30

## 2024-05-30 RX ORDER — DEXTROAMPHETAMINE SACCHARATE, AMPHETAMINE ASPARTATE MONOHYDRATE, DEXTROAMPHETAMINE SULFATE AND AMPHETAMINE SULFATE 6.25; 6.25; 6.25; 6.25 MG/1; MG/1; MG/1; MG/1
25 CAPSULE, EXTENDED RELEASE ORAL DAILY
Qty: 30 CAPSULE | Refills: 0 | Status: SHIPPED | OUTPATIENT
Start: 2024-05-31 | End: 2025-05-31

## 2024-07-04 PROCEDURE — 87491 CHLMYD TRACH DNA AMP PROBE: CPT | Performed by: FAMILY MEDICINE

## 2024-07-04 PROCEDURE — 87086 URINE CULTURE/COLONY COUNT: CPT | Performed by: FAMILY MEDICINE

## 2024-07-04 PROCEDURE — 87591 N.GONORRHOEAE DNA AMP PROB: CPT | Performed by: FAMILY MEDICINE

## 2024-07-11 ENCOUNTER — CLINICAL SUPPORT (OUTPATIENT)
Dept: FAMILY MEDICINE CLINIC | Facility: CLINIC | Age: 34
End: 2024-07-11
Payer: COMMERCIAL

## 2024-07-11 ENCOUNTER — OFFICE VISIT (OUTPATIENT)
Dept: BEHAVIORAL HEALTH | Facility: CLINIC | Age: 34
End: 2024-07-11
Payer: COMMERCIAL

## 2024-07-11 VITALS
DIASTOLIC BLOOD PRESSURE: 80 MMHG | SYSTOLIC BLOOD PRESSURE: 127 MMHG | WEIGHT: 187 LBS | HEIGHT: 69 IN | HEART RATE: 109 BPM | OXYGEN SATURATION: 100 % | BODY MASS INDEX: 27.7 KG/M2

## 2024-07-11 DIAGNOSIS — Z79.899 MEDICATION MANAGEMENT: Primary | ICD-10-CM

## 2024-07-11 DIAGNOSIS — F90.0 ATTENTION DEFICIT HYPERACTIVITY DISORDER (ADHD), PREDOMINANTLY INATTENTIVE TYPE: ICD-10-CM

## 2024-07-11 DIAGNOSIS — Z79.899 ENCOUNTER FOR MEDICATION MANAGEMENT: Primary | ICD-10-CM

## 2024-07-11 DIAGNOSIS — F41.1 GENERALIZED ANXIETY DISORDER: ICD-10-CM

## 2024-07-11 DIAGNOSIS — F41.0 PANIC ATTACKS: ICD-10-CM

## 2024-07-11 DIAGNOSIS — F33.1 MODERATE EPISODE OF RECURRENT MAJOR DEPRESSIVE DISORDER: ICD-10-CM

## 2024-07-11 LAB
AMPHET+METHAMPHET UR QL: POSITIVE
AMPHETAMINE INTERNAL CONTROL: ABNORMAL
AMPHETAMINES UR QL: NEGATIVE
BARBITURATE INTERNAL CONTROL: ABNORMAL
BARBITURATES UR QL SCN: NEGATIVE
BENZODIAZ UR QL SCN: NEGATIVE
BENZODIAZEPINE INTERNAL CONTROL: ABNORMAL
BUPRENORPHINE INTERNAL CONTROL: ABNORMAL
BUPRENORPHINE SERPL-MCNC: NEGATIVE NG/ML
CANNABINOIDS SERPL QL: NEGATIVE
COCAINE INTERNAL CONTROL: ABNORMAL
COCAINE UR QL: NEGATIVE
EXPIRATION DATE: ABNORMAL
Lab: ABNORMAL
MDMA (ECSTASY) INTERNAL CONTROL: ABNORMAL
MDMA UR QL SCN: NEGATIVE
METHADONE INTERNAL CONTROL: ABNORMAL
METHADONE UR QL SCN: NEGATIVE
METHAMPHETAMINE INTERNAL CONTROL: ABNORMAL
MORPHINE INTERNAL CONTROL: ABNORMAL
MORPHINE/OPIATES SCREEN, URINE: NEGATIVE
OXYCODONE INTERNAL CONTROL: ABNORMAL
OXYCODONE UR QL SCN: NEGATIVE
PCP UR QL SCN: NEGATIVE
PHENCYCLIDINE INTERNAL CONTROL: ABNORMAL
THC INTERNAL CONTROL: ABNORMAL

## 2024-07-11 PROCEDURE — 80305 DRUG TEST PRSMV DIR OPT OBS: CPT | Performed by: NURSE PRACTITIONER

## 2024-07-11 RX ORDER — PROPRANOLOL HYDROCHLORIDE 10 MG/1
10 TABLET ORAL 2 TIMES DAILY PRN
Qty: 180 TABLET | Refills: 0 | Status: SHIPPED | OUTPATIENT
Start: 2024-07-11

## 2024-07-11 RX ORDER — DEXTROAMPHETAMINE SACCHARATE, AMPHETAMINE ASPARTATE MONOHYDRATE, DEXTROAMPHETAMINE SULFATE AND AMPHETAMINE SULFATE 6.25; 6.25; 6.25; 6.25 MG/1; MG/1; MG/1; MG/1
25 CAPSULE, EXTENDED RELEASE ORAL DAILY
Qty: 30 CAPSULE | Refills: 0 | Status: SHIPPED | OUTPATIENT
Start: 2024-07-11 | End: 2025-07-11

## 2024-07-11 RX ORDER — VORTIOXETINE 20 MG/1
1 TABLET, FILM COATED ORAL
Qty: 30 TABLET | Refills: 1 | Status: SHIPPED | OUTPATIENT
Start: 2024-07-11

## 2024-09-12 ENCOUNTER — OFFICE VISIT (OUTPATIENT)
Dept: BEHAVIORAL HEALTH | Facility: CLINIC | Age: 34
End: 2024-09-12
Payer: COMMERCIAL

## 2024-09-12 VITALS
HEIGHT: 69 IN | WEIGHT: 189.4 LBS | HEART RATE: 85 BPM | BODY MASS INDEX: 28.05 KG/M2 | DIASTOLIC BLOOD PRESSURE: 81 MMHG | SYSTOLIC BLOOD PRESSURE: 106 MMHG | OXYGEN SATURATION: 98 %

## 2024-09-12 DIAGNOSIS — F41.1 GENERALIZED ANXIETY DISORDER: ICD-10-CM

## 2024-09-12 DIAGNOSIS — F41.0 PANIC ATTACKS: ICD-10-CM

## 2024-09-12 DIAGNOSIS — F33.1 MODERATE EPISODE OF RECURRENT MAJOR DEPRESSIVE DISORDER: Primary | ICD-10-CM

## 2024-09-12 DIAGNOSIS — F90.0 ATTENTION DEFICIT HYPERACTIVITY DISORDER (ADHD), PREDOMINANTLY INATTENTIVE TYPE: ICD-10-CM

## 2024-09-12 PROCEDURE — 90833 PSYTX W PT W E/M 30 MIN: CPT

## 2024-09-12 PROCEDURE — 99214 OFFICE O/P EST MOD 30 MIN: CPT

## 2024-09-12 RX ORDER — DEXTROAMPHETAMINE SACCHARATE, AMPHETAMINE ASPARTATE MONOHYDRATE, DEXTROAMPHETAMINE SULFATE AND AMPHETAMINE SULFATE 6.25; 6.25; 6.25; 6.25 MG/1; MG/1; MG/1; MG/1
25 CAPSULE, EXTENDED RELEASE ORAL DAILY
Qty: 30 CAPSULE | Refills: 0 | Status: SHIPPED | OUTPATIENT
Start: 2024-09-12 | End: 2025-09-12

## 2024-09-12 RX ORDER — PROPRANOLOL HCL 10 MG
10 TABLET ORAL 2 TIMES DAILY PRN
Qty: 180 TABLET | Refills: 0 | Status: SHIPPED | OUTPATIENT
Start: 2024-09-12

## 2024-09-12 RX ORDER — VORTIOXETINE 20 MG/1
1 TABLET, FILM COATED ORAL
Qty: 30 TABLET | Refills: 2 | Status: SHIPPED | OUTPATIENT
Start: 2024-09-12

## 2024-09-13 ENCOUNTER — TELEPHONE (OUTPATIENT)
Dept: PSYCHIATRY | Facility: CLINIC | Age: 34
End: 2024-09-13
Payer: COMMERCIAL

## 2024-11-08 DIAGNOSIS — F90.0 ATTENTION DEFICIT HYPERACTIVITY DISORDER (ADHD), PREDOMINANTLY INATTENTIVE TYPE: ICD-10-CM

## 2024-11-08 RX ORDER — DEXTROAMPHETAMINE SACCHARATE, AMPHETAMINE ASPARTATE MONOHYDRATE, DEXTROAMPHETAMINE SULFATE AND AMPHETAMINE SULFATE 6.25; 6.25; 6.25; 6.25 MG/1; MG/1; MG/1; MG/1
25 CAPSULE, EXTENDED RELEASE ORAL DAILY
Qty: 30 CAPSULE | Refills: 0 | Status: SHIPPED | OUTPATIENT
Start: 2024-11-08 | End: 2025-11-08

## 2024-11-08 RX ORDER — HYDROCORTISONE 25 MG/G
CREAM TOPICAL 2 TIMES DAILY
Qty: 20 G | Refills: 0 | Status: SHIPPED | OUTPATIENT
Start: 2024-11-08

## 2024-11-08 NOTE — TELEPHONE ENCOUNTER
REFILL REQUEST:     amphetamine-dextroamphetamine XR (ADDERALL XR) 25 MG 24 hr capsule (09/12/2024)     F/UP- 12/12/2024.  LOV: 09/12/2024.    LAST UDS:  POC Medline 12 Panel Urine Drug Screen (07/11/2024 08:55)

## 2024-12-12 DIAGNOSIS — F90.0 ATTENTION DEFICIT HYPERACTIVITY DISORDER (ADHD), PREDOMINANTLY INATTENTIVE TYPE: ICD-10-CM

## 2024-12-12 RX ORDER — DEXTROAMPHETAMINE SACCHARATE, AMPHETAMINE ASPARTATE MONOHYDRATE, DEXTROAMPHETAMINE SULFATE AND AMPHETAMINE SULFATE 6.25; 6.25; 6.25; 6.25 MG/1; MG/1; MG/1; MG/1
25 CAPSULE, EXTENDED RELEASE ORAL DAILY
Qty: 30 CAPSULE | Refills: 0 | Status: SHIPPED | OUTPATIENT
Start: 2024-12-12 | End: 2025-12-12

## 2024-12-12 NOTE — TELEPHONE ENCOUNTER
REFILL REQUEST:     amphetamine-dextroamphetamine XR (ADDERALL XR) 25 MG 24 hr capsule (11/08/2024)     F/UP- 01/30/2025.  LOV: 09/12/2024.    LAST UDS:  POC Medline 12 Panel Urine Drug Screen (07/11/2024 08:55)

## 2024-12-24 DIAGNOSIS — F41.1 GENERALIZED ANXIETY DISORDER: ICD-10-CM

## 2024-12-24 DIAGNOSIS — F41.0 PANIC ATTACKS: ICD-10-CM

## 2024-12-26 RX ORDER — PROPRANOLOL HYDROCHLORIDE 10 MG/1
TABLET ORAL
Qty: 180 TABLET | Refills: 0 | Status: SHIPPED | OUTPATIENT
Start: 2024-12-26

## 2025-01-09 ENCOUNTER — TELEPHONE (OUTPATIENT)
Dept: BEHAVIORAL HEALTH | Facility: CLINIC | Age: 35
End: 2025-01-09
Payer: COMMERCIAL

## 2025-01-09 NOTE — TELEPHONE ENCOUNTER
CALLED PT TO SEE IF SHE CAN DO A MYCHART VISIT TOMORROW 1/10/2025 AT 9:40AM DUE TO THE WEATHER.LEFT VM

## 2025-01-10 ENCOUNTER — TELEMEDICINE (OUTPATIENT)
Dept: BEHAVIORAL HEALTH | Facility: CLINIC | Age: 35
End: 2025-01-10
Payer: COMMERCIAL

## 2025-01-10 DIAGNOSIS — F41.1 GENERALIZED ANXIETY DISORDER: ICD-10-CM

## 2025-01-10 DIAGNOSIS — F33.1 MODERATE EPISODE OF RECURRENT MAJOR DEPRESSIVE DISORDER: Primary | ICD-10-CM

## 2025-01-10 DIAGNOSIS — F41.0 PANIC ATTACKS: ICD-10-CM

## 2025-01-10 DIAGNOSIS — F90.0 ATTENTION DEFICIT HYPERACTIVITY DISORDER (ADHD), PREDOMINANTLY INATTENTIVE TYPE: ICD-10-CM

## 2025-01-10 RX ORDER — ARIPIPRAZOLE 2 MG/1
2 TABLET ORAL DAILY
Qty: 30 TABLET | Refills: 1 | Status: SHIPPED | OUTPATIENT
Start: 2025-01-10

## 2025-01-10 NOTE — PROGRESS NOTES
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 ADHD  Psychiatrist: Yes, previously  Therapist: Yes, previously  Admission History: Denies  Medication Trials: Straterra (high bp), wellbutrin SR, prozac, focalin, celexa, zoloft, lexapro, ativan, buspar, trintellix  Suicide Attempts: 2011, tried to OD on pills  Self Harm: Hx of cutting, denies any current self-harming  Substance use/abuse: Alcohol, on occasion  Withdrawal Symptoms: Not applicable  Longest Period Sober: Not applicable  AA: Not applicable  Trauma/Childhood/ACE: Lost one of her close friends unexpectedly, this was traumatic, average childhood    Safety/Risk Assessment: Risk of self-harm acutely and chronically is moderate to severe.    Static/Dynamic risk factors include diagnosis of mental disorder, psychosocial stressors,Previous self-harm, Previous suicide attempt, Recent stressor or loss, and Social factors.      MENTAL STATUS EXAM   General Appearance:  Cleanly groomed and dressed and well developed  Eye Contact:  Good eye contact  Attitude:  Cooperative and polite  Motor Activity:  Normal gait, posture  Speech:  Normal rate, tone, volume  Mood and affect:  Normal, pleasant and euthymic  Hopelessness:  Denies  Thought Process:  Logical and goal-directed  Associations/ Thought Content:  No delusions  Hallucinations:  None  Suicidal Ideations:   Not present  Homicidal Ideation:  Not present  Sensorium:  Alert  Orientation:  Person, place, time and situation  Immediate Recall, Recent, and Remote Memory:  Intact  Attention Span/ Concentration:  Good  Fund of Knowledge:  Appropriate for age and educational level  Intellectual Functioning:  Average range  Insight:  Good  Judgement:  Good  Reliability:  Good  Impulse Control:  Good       Review of systems is negative except as noted in HPI.  Labs:  WBC   Date Value Ref Range Status   04/23/2024 9.86 3.40 - 10.80 10*3/mm3 Final     Platelets   Date Value Ref Range Status   04/23/2024 298 140 - 450 10*3/mm3 Final     Hemoglobin   Date Value Ref Range Status   04/23/2024 14.1 12.0 - 15.9 g/dL Final     Hematocrit   Date Value Ref Range Status   04/23/2024 43.0 34.0 - 46.6 % Final     Glucose   Date Value Ref Range Status   04/23/2024 96 65 - 99 mg/dL Final     Creatinine   Date Value Ref Range Status   04/23/2024 0.90 0.57 - 1.00 mg/dL Final     ALT (SGPT)   Date Value Ref Range Status   04/23/2024 13 1 - 33 U/L Final     AST (SGOT)   Date Value Ref Range Status   04/23/2024 18 1 - 32 U/L Final     BUN   Date Value Ref Range Status   04/23/2024 9 6 - 20 mg/dL Final     eGFR   Date Value Ref Range Status   04/23/2024 85.7 >60.0 mL/min/1.73 Final     Total Cholesterol   Date Value Ref Range Status   05/22/2023 187 0 - 200 mg/dL Final     Triglycerides   Date Value Ref Range Status   05/22/2023 60 0 - 150 mg/dL Final     HDL Cholesterol   Date Value Ref Range Status   05/22/2023 66 (H) 40 - 60 mg/dL Final     LDL Cholesterol    Date Value Ref Range Status   05/22/2023 110 (H) 0 - 100 mg/dL Final     VLDL Cholesterol   Date Value Ref Range Status   05/22/2023 11 5 - 40 mg/dL Final     LDL/HDL Ratio   Date Value Ref Range Status   05/22/2023 1.65  Final     TSH   Date Value Ref Range Status   05/22/2023 0.821 0.270 - 4.200 uIU/mL Final      Pain Management Panel  More data may exist         Latest Ref Rng & Units  7/11/2024 11/16/2023   Pain Management Panel   Amphetamine, Urine Qual Negative Positive  Negative    Barbiturates Screen, Urine Negative Negative  Negative    Benzodiazepine Screen, Urine Negative Negative  Negative    Buprenorphine, Screen, Urine Negative Negative  -   Cocaine Screen, Urine Negative Negative  Negative    Methadone Screen , Urine Negative Negative  Negative    Methamphetamine, Ur Negative Negative  -      Details                  Imaging Results:  No Images in the past 120 days found..  Current Medications:   Current Outpatient Medications   Medication Sig Dispense Refill    albuterol sulfate  (90 Base) MCG/ACT inhaler Inhale 2 puffs Every 4 (Four) Hours As Needed for Wheezing.      amphetamine-dextroamphetamine XR (ADDERALL XR) 25 MG 24 hr capsule Take 1 capsule by mouth Daily 30 capsule 0    ARIPiprazole (Abilify) 2 MG tablet Take 1 tablet by mouth Daily. 30 tablet 1    azelastine (ASTELIN) 0.1 % nasal spray USE 1 TO 2 SPRAYS IN EACH NOSTRIL TWICE DAILY AS NEEDED      cetirizine (zyrTEC) 10 MG tablet Take 1 tablet by mouth Daily.      diclofenac (VOLTAREN) 75 MG EC tablet Take 1 tablet by mouth 2 (Two) Times a Day. (Patient not taking: Reported on 7/11/2024) 20 tablet 0    famotidine (PEPCID) 20 MG tablet Take 1 tablet by mouth Daily.      fluticasone (FLONASE) 50 MCG/ACT nasal spray 1 spray Daily.      hydrocortisone 2.5 % cream Apply  topically to the appropriate area as directed 2 (Two) Times a Day. 20 g 0    levocetirizine (XYZAL) 5 MG tablet Take 1 tablet by mouth.      methocarbamol (ROBAXIN) 750 MG tablet Take 1 tablet by mouth 3 (Three) Times a Day As Needed for Muscle Spasms. (Patient not taking: Reported on 7/11/2024) 20 tablet 0    phenazopyridine (PYRIDIUM) 100 MG tablet Take 1 tablet by mouth 3 (Three) Times a Day As Needed for Dysuria. (Patient not taking: Reported on 7/11/2024) 6 tablet 0    propranolol (INDERAL) 10 MG tablet TAKE 1 TABLET BY MOUTH TWICE DAILY AS NEEDED FOR  ANXIETY OR PANIC ATTACKS 180 tablet 0    tacrolimus (PROTOPIC) 0.1 % ointment APPLY THIN LAYER TO EYELIDS TWICE DAILY      vitamin D3 125 MCG (5000 UT) capsule capsule Take 125 mcg by mouth Daily.      Vortioxetine HBr (Trintellix) 20 MG tablet Take 1 tablet by mouth Daily With Breakfast. 30 tablet 2     Current Facility-Administered Medications   Medication Dose Route Frequency Provider Last Rate Last Admin    Levonorgestrel (MIRENA) 20 MCG/DAY IUD intrauterine device 1 each  1 each Intrauterine Once Cleopatra Arteaga DO         Problem List:  Patient Active Problem List   Diagnosis    Gastroesophageal reflux disease    Allergic rhinitis    ROSIBEL (generalized anxiety disorder)    Depression    Vitamin D deficiency    Arthritis    Asthma    Concussion with loss of consciousness     Allergy:   Allergies   Allergen Reactions    Prednisone Shortness Of Breath     Oral steroids, reports does ok with injections      Discontinued Medications:  There are no discontinued medications.    PLAN:   Presentation seems most consistent with DSM-V criteria for:  Diagnoses and all orders for this visit:    1. Moderate episode of recurrent major depressive disorder (Primary)  -     ARIPiprazole (Abilify) 2 MG tablet; Take 1 tablet by mouth Daily.  Dispense: 30 tablet; Refill: 1    2. Generalized anxiety disorder  -     ARIPiprazole (Abilify) 2 MG tablet; Take 1 tablet by mouth Daily.  Dispense: 30 tablet; Refill: 1    3. Attention deficit hyperactivity disorder (ADHD), predominantly inattentive type    4. Panic attacks       Continue Trintellix to 20 mg daily  Continue propanolol 10 mg twice daily as needed  Continue Adderall XR 25 mg daily  Start abilify 2 mg daily  Follow-up 1 month  Medication Education:   INDERAL (PROPANOLOL) Risks, benefits, alternatives discussed with patient including dizziness, sedation, falls, low blood pressure, low heart rate, possible exacerbation of asthma.  After discussion of these risks and benefits,  the patient voiced understanding and agreed to proceed.  TRINTELLIX (VORTIOXETINE) Risks, benefits, alternatives discussed with patient including nausea, vomiting, constipation, sexual dysfunction.  Onset of action is usually in 2 weeks.  After discussion of these risks and benefits, the patient voiced understanding and agreed to proceed.  ADDERALL (AMPHETAMINE) Risks, benefits, side effects discussed with patient including elevated heart rate, elevated blood pressure, irritability, insomnia, sexual dysfunction, appetite suppressing properties, psychosis.  After discussion of these risks and benefits, the patient voiced understanding and agreed to proceed. Adilia reviewed, UDS ordered, and controlled substance agreement signed & witnessed.  Medications:   New Medications Ordered This Visit   Medications    ARIPiprazole (Abilify) 2 MG tablet     Sig: Take 1 tablet by mouth Daily.     Dispense:  30 tablet     Refill:  1      ADILIA reviewed.   Discussed medication options and treatment plan of prescribed medication as well as the risks, benefits, and side effects.  Patient is agreeable to call the office with any worsening of symptoms or onset of side effects.   Patient is agreeable to call 911 or go to the nearest ER should he/she begin having SI/HI.   Patient acknowledged, is agreeable to continue with current treatment plan, and was educated on the importance of compliance with treatment and follow-up appointments.  Addressed all questions and concerns.     Psychotherapy:      Psychotherapy time 20 minutes.  This time is exclusive to the therapy session and separate from the time spent on activities used to meet the criteria for the E/M service (history, exam, medical decision-making).  Goal is to strengthen defenses, promote problems solving, restore adaptive functioning, and provide symptom relief. Esteem building was enhanced through praise, reassurance, normalizing and encouragement. Coping skills were enhanced  to build distress tolerance skills and emotional regulation. Allowed patient to freely discuss issues without interruption or judgement with unconditional positive regard, active listening skills, and empathy. Provided a safe, confidential environment to facilitate the development of a positive therapeutic relationship and encourage open, honest communication. Assisted patient in processing session content, acknowledged and normalized patient’s thoughts, feelings, and concerns by utilizing a person-centered approach in efforts to build appropriate rapport and a positive therapeutic relationship with open and honest communication. Plan to continue supportive psychotherapy in next appointment to provide symptom relief.    Functional status: Moderate symptoms OR moderate difficulty in social occupational or social functioning (51-60)  Prognosis: Good chance of responding well to treatment   Progress: waxing and waning but better overall      Follow-up: Return in about 3 weeks (around 1/31/2025).     This document has been electronically signed by DEJUAN Lam  January 23, 2025 09:09 EST  Please note that portions of this note were completed with a voice recognition program.  Copied text in this note has been reviewed and is accurate as of 01/23/25

## 2025-01-23 NOTE — PLAN OF CARE
"CBT/Supportive  Allowed patient to process topics such as work is really busy right now. Feeling a \"burnout problem.\". Individual psychotherapy was provided utilizing solution focused techniques to restore adaptive functioning, provide symptom relief, discuss values and strengths, manage stress, identify triggers, recognize patterns of behavior, acknowledge sources of feelings and behaviors, assess symptoms, provide support, and discuss interpersonal conflicts. The therapeutic alliance was strengthened to encourage the patient to express their thoughts and feelings.     Psychotherapy time 20 minutes.  This time is exclusive to the therapy session and separate from the time spent on activities used to meet the criteria for the E/M service (history, exam, medical decision-making).  Goal is to strengthen defenses, promote problems solving, restore adaptive functioning, and provide symptom relief. Esteem building was enhanced through praise, reassurance, normalizing and encouragement. Coping skills were enhanced to build distress tolerance skills and emotional regulation. Allowed patient to freely discuss issues without interruption or judgement with unconditional positive regard, active listening skills, and empathy. Provided a safe, confidential environment to facilitate the development of a positive therapeutic relationship and encourage open, honest communication. Assisted patient in processing session content, acknowledged and normalized patient’s thoughts, feelings, and concerns by utilizing a person-centered approach in efforts to build appropriate rapport and a positive therapeutic relationship with open and honest communication. Plan to continue supportive psychotherapy in next appointment to provide symptom relief.  "

## 2025-01-23 NOTE — PATIENT INSTRUCTIONS
1.  Please return to clinic at your next scheduled visit.  Please contact the clinic ([phone]) at least 24 hours prior in the event you need to cancel.  2.  Do no harm to yourself or others.    3.  Avoid alcohol and drugs.    4.  Take all medications as prescribed.  Please contact the clinic with any concerns. If you are in need of medication refills, please call the clinic at [phone].    5. Should you want to get in touch with your provider, DEJUAN Lam, please contact the office ([phone]), and staff will be able to page Bekah directly.  6. In the event you have personal crisis, contact the following crisis numbers: Suicide Prevention Hotline 1-899.846.5854; [facility] Helpline 0-822-670-IOTY; [facility] Emergency Room [phone]; text HELLO to 910509; or 277.     SPECIFIC RECOMMENDATIONS:     1.      Medications discussed at this encounter:     New Medications Ordered This Visit   Medications    ARIPiprazole (Abilify) 2 MG tablet     Sig: Take 1 tablet by mouth Daily.     Dispense:  30 tablet     Refill:  1                       2.      Psychotherapy recommendations: We will continue therapy at future visits.     3.     Return to clinic: Return in about 3 weeks (around 1/31/2025).

## 2025-01-23 NOTE — TREATMENT PLAN
Multi-Disciplinary Problems (from Behavioral Health Treatment Plan)      Active Problems       Problem: Anxiety  Start Date: 01/10/25      Problem Details: The patient self-scales this problem as a 8 with 10 being the worst.          Goal Priority Start Date Expected End Date End Date    Patient will develop and implement behavioral and cognitive strategies to reduce anxiety and irrational fears. -- 01/10/25 07/24/25 --    Goal Details: Functional status: Moderate symptoms OR moderate difficulty in social occupational or social functioning (51-60)  Prognosis: Good chance of responding well to treatment   Progress: waxing and waning but better overall          Goal Intervention Frequency Start Date End Date    Help patient explore past emotional issues in relation to present anxiety. Q Month 01/10/25 --    Intervention Details: Duration of treatment until remission of symptoms.          Goal Intervention Frequency Start Date End Date    Help patient develop an awareness of their cognitive and physical responses to anxiety. Q Month 01/10/25 --    Intervention Details: Duration of treatment until remission of symptoms.                          Reviewed By       Bekah Chapman APRN 01/23/25 [id number]                     I have discussed and reviewed this treatment plan with the patient.

## 2025-01-30 DIAGNOSIS — F90.0 ATTENTION DEFICIT HYPERACTIVITY DISORDER (ADHD), PREDOMINANTLY INATTENTIVE TYPE: ICD-10-CM

## 2025-01-30 RX ORDER — DEXTROAMPHETAMINE SACCHARATE, AMPHETAMINE ASPARTATE MONOHYDRATE, DEXTROAMPHETAMINE SULFATE AND AMPHETAMINE SULFATE 6.25; 6.25; 6.25; 6.25 MG/1; MG/1; MG/1; MG/1
25 CAPSULE, EXTENDED RELEASE ORAL DAILY
Qty: 30 CAPSULE | Refills: 0 | Status: SHIPPED | OUTPATIENT
Start: 2025-01-30 | End: 2026-01-30

## 2025-01-30 NOTE — TELEPHONE ENCOUNTER
REFILL REQUEST:     amphetamine-dextroamphetamine XR (ADDERALL XR) 25 MG 24 hr capsule (12/12/2024)     F/UP- 02/13/2025.  LOV: 01/10/2025.    LAST UDS:  POC Medline 12 Panel Urine Drug Screen (07/11/2024 08:55)

## 2025-02-13 ENCOUNTER — OFFICE VISIT (OUTPATIENT)
Dept: BEHAVIORAL HEALTH | Facility: CLINIC | Age: 35
End: 2025-02-13
Payer: COMMERCIAL

## 2025-02-13 VITALS
WEIGHT: 193 LBS | SYSTOLIC BLOOD PRESSURE: 127 MMHG | HEART RATE: 104 BPM | BODY MASS INDEX: 28.58 KG/M2 | DIASTOLIC BLOOD PRESSURE: 78 MMHG | HEIGHT: 69 IN

## 2025-02-13 DIAGNOSIS — F33.1 MODERATE EPISODE OF RECURRENT MAJOR DEPRESSIVE DISORDER: Primary | ICD-10-CM

## 2025-02-13 DIAGNOSIS — F41.1 GENERALIZED ANXIETY DISORDER: ICD-10-CM

## 2025-02-13 DIAGNOSIS — F90.0 ATTENTION DEFICIT HYPERACTIVITY DISORDER (ADHD), PREDOMINANTLY INATTENTIVE TYPE: ICD-10-CM

## 2025-02-13 DIAGNOSIS — F41.0 PANIC ATTACKS: ICD-10-CM

## 2025-02-13 RX ORDER — VORTIOXETINE 20 MG/1
1 TABLET, FILM COATED ORAL
Qty: 30 TABLET | Refills: 2 | Status: SHIPPED | OUTPATIENT
Start: 2025-02-13

## 2025-02-13 NOTE — PATIENT INSTRUCTIONS
1.  Please return to clinic at your next scheduled visit.  Please contact the clinic ([phone]) at least 24 hours prior in the event you need to cancel.  2.  Do no harm to yourself or others.    3.  Avoid alcohol and drugs.    4.  Take all medications as prescribed.  Please contact the clinic with any concerns. If you are in need of medication refills, please call the clinic at [phone].    5. Should you want to get in touch with your provider, DEJUAN Lam, please contact the office ([phone]), and staff will be able to page Bekah directly.  6. In the event you have personal crisis, contact the following crisis numbers: Suicide Prevention Hotline 1-110.849.9532; [facility] Helpline 6-021-023-JRGH; [facility] Emergency Room [phone]; text HELLO to 404089; or 789.     SPECIFIC RECOMMENDATIONS:     1.      Medications discussed at this encounter:     New Medications Ordered This Visit   Medications    Vortioxetine HBr (Trintellix) 20 MG tablet     Sig: Take 1 tablet by mouth Daily With Breakfast.     Dispense:  30 tablet     Refill:  2                       2.      Psychotherapy recommendations: We will continue therapy at future visits.     3.     Return to clinic: Return in about 2 months (around 4/13/2025).

## 2025-02-13 NOTE — PROGRESS NOTES
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 for neuropsychological testing: ADHD  Follow-up 1 month    ADHD:  Symptoms are persistent and significantly interfere with major life activities and/or result in significant suffering  With focus on K5 through 6th grade only   Grades: Always had good grades  Behavioral issues:\"goofing off\" distracting others  Special Classes:Gifted programs  Inattention:Yes  Referral for ADHD testing: Denies     Often fails to give close attention to details or makes careless mistakes in schoolwork, at work, or during other activities:Yes  Often has difficulty sustaining attention in tasks:Yes  Often has difficulty organizing tasks and activities:Yes  Often avoids, dislikes or is reluctant to engage in tasks that require sustained mental effort: No  Often loses things necessary for tasks or activities: No  Is often easily distracted by extraneous stimuli: Yes  Is often forgetful in daily activities: Yes  Hyperactivity and Impulsivity: No  Often fidgets with or taps hands or feet: Yes  Often feels restless: Yes  Is often “on the go”, acting as if “driven by a motor”: Yes  Often talks excessively: Yes  Often has difficulty waiting their turn: Yes  Often interrupts or intrudes on others: Yes    Record Review for 08/18/2023 : I have thoroughly reviewed the patient's electronic medical record to include previous encounters, care " everywhere, notes, medications, labs, ADILIA and UDS (if applicable), imaging, and EKG's.  Pertinent information is included in this note.  5/22/2023 TSH 0.821, LDL cholesterol 110, lipid panel is otherwise reassuring, CBC and CMP are reassuring.  EKG Results:  None in record  Head Imaging:  None in record      Per Referring Provider 7/20/2023 Malathi is here to be seen for a follow up. She is requesting to see behavioral health to have an evaluation and formally diagnose her. She has been told she has depression, anxiety, and adhd. She had previously been on focalin for adhd and is currently on wellbutrin and prozac for depression and anxiety     Past Psychiatric History:  Began Treatment: 2008  Diagnoses: Depression, anxiety, ADHD  Psychiatrist: Yes, previously  Therapist: Yes, previously  Admission History: Denies  Medication Trials: Straterra (high bp), wellbutrin SR, prozac, focalin, celexa, zoloft, lexapro, ativan, buspar, trintellix  Suicide Attempts: 2011, tried to OD on pills  Self Harm: Hx of cutting, denies any current self-harming  Substance use/abuse: Alcohol, on occasion  Withdrawal Symptoms: Not applicable  Longest Period Sober: Not applicable  AA: Not applicable  Trauma/Childhood/ACE: Lost one of her close friends unexpectedly, this was traumatic, average childhood    Safety/Risk Assessment: Risk of self-harm acutely and chronically is moderate to severe.    Static/Dynamic risk factors include diagnosis of mental disorder, psychosocial stressors,Recent stressor or loss and Social factors.      MENTAL STATUS EXAM   General Appearance:  Cleanly groomed and dressed and well developed  Eye Contact:  Good eye contact  Attitude:  Cooperative and polite  Motor Activity:  Normal gait, posture  Speech:  Normal rate, tone, volume  Mood and affect:  Normal, pleasant and euthymic  Hopelessness:  Denies  Thought Process:  Logical and goal-directed  Associations/ Thought Content:  No delusions  Hallucinations:   None  Suicidal Ideations:  Not present  Homicidal Ideation:  Not present  Sensorium:  Alert  Orientation:  Person, place, time and situation  Immediate Recall, Recent, and Remote Memory:  Intact  Attention Span/ Concentration:  Good  Fund of Knowledge:  Appropriate for age and educational level  Intellectual Functioning:  Average range  Insight:  Good  Judgement:  Good  Reliability:  Good  Impulse Control:  Good       Review of systems is negative except as noted in HPI.  Labs:  WBC   Date Value Ref Range Status   04/23/2024 9.86 3.40 - 10.80 10*3/mm3 Final     Platelets   Date Value Ref Range Status   04/23/2024 298 140 - 450 10*3/mm3 Final     Hemoglobin   Date Value Ref Range Status   04/23/2024 14.1 12.0 - 15.9 g/dL Final     Hematocrit   Date Value Ref Range Status   04/23/2024 43.0 34.0 - 46.6 % Final     Glucose   Date Value Ref Range Status   04/23/2024 96 65 - 99 mg/dL Final     Creatinine   Date Value Ref Range Status   04/23/2024 0.90 0.57 - 1.00 mg/dL Final     ALT (SGPT)   Date Value Ref Range Status   04/23/2024 13 1 - 33 U/L Final     AST (SGOT)   Date Value Ref Range Status   04/23/2024 18 1 - 32 U/L Final     BUN   Date Value Ref Range Status   04/23/2024 9 6 - 20 mg/dL Final     eGFR   Date Value Ref Range Status   04/23/2024 85.7 >60.0 mL/min/1.73 Final     Total Cholesterol   Date Value Ref Range Status   05/22/2023 187 0 - 200 mg/dL Final     Triglycerides   Date Value Ref Range Status   05/22/2023 60 0 - 150 mg/dL Final     HDL Cholesterol   Date Value Ref Range Status   05/22/2023 66 (H) 40 - 60 mg/dL Final     LDL Cholesterol    Date Value Ref Range Status   05/22/2023 110 (H) 0 - 100 mg/dL Final     VLDL Cholesterol   Date Value Ref Range Status   05/22/2023 11 5 - 40 mg/dL Final     LDL/HDL Ratio   Date Value Ref Range Status   05/22/2023 1.65  Final     TSH   Date Value Ref Range Status   05/22/2023 0.821 0.270 - 4.200 uIU/mL Final      Pain Management Panel  More data may exist          Latest Ref Rng & Units 7/11/2024 11/16/2023   Pain Management Panel   Amphetamine, Urine Qual Negative Positive  Negative    Barbiturates Screen, Urine Negative Negative  Negative    Benzodiazepine Screen, Urine Negative Negative  Negative    Buprenorphine, Screen, Urine Negative Negative  -   Cocaine Screen, Urine Negative Negative  Negative    Methadone Screen , Urine Negative Negative  Negative    Methamphetamine, Ur Negative Negative  -      Details                  Imaging Results:  No Images in the past 120 days found..  Current Medications:   Current Outpatient Medications   Medication Sig Dispense Refill    albuterol sulfate  (90 Base) MCG/ACT inhaler Inhale 2 puffs Every 4 (Four) Hours As Needed for Wheezing.      amphetamine-dextroamphetamine XR (ADDERALL XR) 25 MG 24 hr capsule Take 1 capsule by mouth Daily 30 capsule 0    azelastine (ASTELIN) 0.1 % nasal spray USE 1 TO 2 SPRAYS IN EACH NOSTRIL TWICE DAILY AS NEEDED      cetirizine (zyrTEC) 10 MG tablet Take 1 tablet by mouth Daily.      famotidine (PEPCID) 20 MG tablet Take 1 tablet by mouth Daily.      fluticasone (FLONASE) 50 MCG/ACT nasal spray 1 spray Daily.      hydrocortisone 2.5 % cream Apply  topically to the appropriate area as directed 2 (Two) Times a Day. 20 g 0    levocetirizine (XYZAL) 5 MG tablet Take 1 tablet by mouth.      propranolol (INDERAL) 10 MG tablet TAKE 1 TABLET BY MOUTH TWICE DAILY AS NEEDED FOR ANXIETY OR PANIC ATTACKS 180 tablet 0    tacrolimus (PROTOPIC) 0.1 % ointment APPLY THIN LAYER TO EYELIDS TWICE DAILY      vitamin D3 125 MCG (5000 UT) capsule capsule Take 125 mcg by mouth Daily.      Vortioxetine HBr (Trintellix) 20 MG tablet Take 1 tablet by mouth Daily With Breakfast. 30 tablet 2     Current Facility-Administered Medications   Medication Dose Route Frequency Provider Last Rate Last Admin    Levonorgestrel (MIRENA) 20 MCG/DAY IUD intrauterine device 1 each  1 each Intrauterine Once Cleopatra Arteaga DO          Problem List:  Patient Active Problem List   Diagnosis    Gastroesophageal reflux disease    Allergic rhinitis    ROSIBEL (generalized anxiety disorder)    Depression    Vitamin D deficiency    Arthritis    Asthma    Concussion with loss of consciousness     Allergy:   Allergies   Allergen Reactions    Prednisone Shortness Of Breath     Oral steroids, reports does ok with injections      Discontinued Medications:  Medications Discontinued During This Encounter   Medication Reason    diclofenac (VOLTAREN) 75 MG EC tablet *Therapy completed    methocarbamol (ROBAXIN) 750 MG tablet *Therapy completed    phenazopyridine (PYRIDIUM) 100 MG tablet *Therapy completed    ARIPiprazole (Abilify) 2 MG tablet Side effects    Vortioxetine HBr (Trintellix) 20 MG tablet Reorder       PLAN:   Presentation seems most consistent with DSM-V criteria for:  Diagnoses and all orders for this visit:    1. Moderate episode of recurrent major depressive disorder (Primary)  -     Vortioxetine HBr (Trintellix) 20 MG tablet; Take 1 tablet by mouth Daily With Breakfast.  Dispense: 30 tablet; Refill: 2    2. Generalized anxiety disorder  -     Vortioxetine HBr (Trintellix) 20 MG tablet; Take 1 tablet by mouth Daily With Breakfast.  Dispense: 30 tablet; Refill: 2    3. Attention deficit hyperactivity disorder (ADHD), predominantly inattentive type    4. Panic attacks          Continue Trintellix to 20 mg daily  Continue propanolol 10 mg twice daily as needed  Continue Adderall XR 25 mg daily  Discontinue abilify  Follow-up 1 month  Medication Education:   INDERAL (PROPANOLOL) Risks, benefits, alternatives discussed with patient including dizziness, sedation, falls, low blood pressure, low heart rate, possible exacerbation of asthma.  After discussion of these risks and benefits, the patient voiced understanding and agreed to proceed.  TRINTELLIX (VORTIOXETINE) Risks, benefits, alternatives discussed with patient including nausea, vomiting,  constipation, sexual dysfunction.  Onset of action is usually in 2 weeks.  After discussion of these risks and benefits, the patient voiced understanding and agreed to proceed.  ADDERALL (AMPHETAMINE) Risks, benefits, side effects discussed with patient including elevated heart rate, elevated blood pressure, irritability, insomnia, sexual dysfunction, appetite suppressing properties, psychosis.  After discussion of these risks and benefits, the patient voiced understanding and agreed to proceed. Adilia reviewed, UDS ordered, and controlled substance agreement signed & witnessed.  Medications:   New Medications Ordered This Visit   Medications    Vortioxetine HBr (Trintellix) 20 MG tablet     Sig: Take 1 tablet by mouth Daily With Breakfast.     Dispense:  30 tablet     Refill:  2      ADILIA reviewed.   Discussed medication options and treatment plan of prescribed medication as well as the risks, benefits, and side effects.  Patient is agreeable to call the office with any worsening of symptoms or onset of side effects.   Patient is agreeable to call 911 or go to the nearest ER should he/she begin having SI/HI.   Patient acknowledged, is agreeable to continue with current treatment plan, and was educated on the importance of compliance with treatment and follow-up appointments.  Addressed all questions and concerns.     Psychotherapy:      Psychotherapy time 20 minutes.  This time is exclusive to the therapy session and separate from the time spent on activities used to meet the criteria for the E/M service (history, exam, medical decision-making).  Goal is to strengthen defenses, promote problems solving, restore adaptive functioning, and provide symptom relief. Esteem building was enhanced through praise, reassurance, normalizing and encouragement. Coping skills were enhanced to build distress tolerance skills and emotional regulation. Allowed patient to freely discuss issues without interruption or judgement with  unconditional positive regard, active listening skills, and empathy. Provided a safe, confidential environment to facilitate the development of a positive therapeutic relationship and encourage open, honest communication. Assisted patient in processing session content, acknowledged and normalized patient’s thoughts, feelings, and concerns by utilizing a person-centered approach in efforts to build appropriate rapport and a positive therapeutic relationship with open and honest communication. Plan to continue supportive psychotherapy in next appointment to provide symptom relief.    Functional status: Moderate symptoms OR moderate difficulty in social occupational or social functioning (51-60)  Prognosis: Good chance of responding well to treatment   Progress: waxing and waning but better overall      Follow-up: Return in about 2 months (around 4/13/2025).     This document has been electronically signed by DEJUAN Lam  February 13, 2025 09:08 EST  Please note that portions of this note were completed with a voice recognition program.  Copied text in this note has been reviewed and is accurate as of 02/13/25

## 2025-02-27 NOTE — PLAN OF CARE
CBT/Supportive  Allowed patient to process topics such as wanting to try lifestyle modification and possibly injections for weight loss, the medication was increasing her appetite. Individual psychotherapy was provided utilizing solution focused techniques to restore adaptive functioning, provide symptom relief, discuss values and strengths, manage stress, identify triggers, recognize patterns of behavior, acknowledge sources of feelings and behaviors, assess symptoms, provide support, and discuss interpersonal conflicts. The therapeutic alliance was strengthened to encourage the patient to express their thoughts and feelings.     Psychotherapy time 20 minutes.  This time is exclusive to the therapy session and separate from the time spent on activities used to meet the criteria for the E/M service (history, exam, medical decision-making).  Goal is to strengthen defenses, promote problems solving, restore adaptive functioning, and provide symptom relief. Esteem building was enhanced through praise, reassurance, normalizing and encouragement. Coping skills were enhanced to build distress tolerance skills and emotional regulation. Allowed patient to freely discuss issues without interruption or judgement with unconditional positive regard, active listening skills, and empathy. Provided a safe, confidential environment to facilitate the development of a positive therapeutic relationship and encourage open, honest communication. Assisted patient in processing session content, acknowledged and normalized patient’s thoughts, feelings, and concerns by utilizing a person-centered approach in efforts to build appropriate rapport and a positive therapeutic relationship with open and honest communication. Plan to continue supportive psychotherapy in next appointment to provide symptom relief.

## 2025-03-21 DIAGNOSIS — F90.0 ATTENTION DEFICIT HYPERACTIVITY DISORDER (ADHD), PREDOMINANTLY INATTENTIVE TYPE: ICD-10-CM

## 2025-03-24 RX ORDER — DEXTROAMPHETAMINE SACCHARATE, AMPHETAMINE ASPARTATE MONOHYDRATE, DEXTROAMPHETAMINE SULFATE AND AMPHETAMINE SULFATE 6.25; 6.25; 6.25; 6.25 MG/1; MG/1; MG/1; MG/1
25 CAPSULE, EXTENDED RELEASE ORAL DAILY
Qty: 30 CAPSULE | Refills: 0 | Status: SHIPPED | OUTPATIENT
Start: 2025-03-24 | End: 2026-03-24

## 2025-03-24 NOTE — TELEPHONE ENCOUNTER
MEDICATION: amphetamine-dextroamphetamine XR (ADDERALL XR) 25 MG 24 hr capsule (01/30/2025)     NEXT OFFICE VISIT: Appointment with Bekah Chapman APRN (04/10/2025)     LAST OFFICE VISIT: Office Visit with Bekah Chapman APRN (02/13/2025)     NARC CONSENT: CONTROLLED SUBSTANCE AGREEMENT - SCAN - [facility], 11/16/2023 (11/16/2023)     URINE DRUG SCREEN(STANDING ORDER)   (BARB PRATT & MENA 1 PER YEAR): POC Pregnancy, Urine (07/04/2024 15:10)     PROVIDER PLEASE ADVISE

## 2025-04-07 DIAGNOSIS — F41.0 PANIC ATTACKS: ICD-10-CM

## 2025-04-07 DIAGNOSIS — F41.1 GENERALIZED ANXIETY DISORDER: ICD-10-CM

## 2025-04-07 RX ORDER — PROPRANOLOL HYDROCHLORIDE 10 MG/1
TABLET ORAL
Qty: 180 TABLET | Refills: 0 | Status: SHIPPED | OUTPATIENT
Start: 2025-04-07

## 2025-04-07 NOTE — TELEPHONE ENCOUNTER
REFILL REQUEST:     propranolol (INDERAL) 10 MG tablet (12/26/2024)     F/UP- 04/10/2025.  LOV: 02/13/2025.

## 2025-04-10 ENCOUNTER — OFFICE VISIT (OUTPATIENT)
Dept: BEHAVIORAL HEALTH | Facility: CLINIC | Age: 35
End: 2025-04-10
Payer: COMMERCIAL

## 2025-04-10 VITALS
SYSTOLIC BLOOD PRESSURE: 127 MMHG | HEIGHT: 69 IN | DIASTOLIC BLOOD PRESSURE: 101 MMHG | HEART RATE: 105 BPM | WEIGHT: 188 LBS | BODY MASS INDEX: 27.85 KG/M2

## 2025-04-10 DIAGNOSIS — F90.0 ATTENTION DEFICIT HYPERACTIVITY DISORDER (ADHD), PREDOMINANTLY INATTENTIVE TYPE: ICD-10-CM

## 2025-04-10 DIAGNOSIS — F41.1 GENERALIZED ANXIETY DISORDER: ICD-10-CM

## 2025-04-10 DIAGNOSIS — F41.0 PANIC ATTACKS: ICD-10-CM

## 2025-04-10 DIAGNOSIS — F33.1 MODERATE EPISODE OF RECURRENT MAJOR DEPRESSIVE DISORDER: Primary | ICD-10-CM

## 2025-04-10 RX ORDER — BENZONATATE 100 MG/1
CAPSULE ORAL
COMMUNITY
Start: 2025-02-25

## 2025-04-10 RX ORDER — DEXTROAMPHETAMINE SACCHARATE, AMPHETAMINE ASPARTATE MONOHYDRATE, DEXTROAMPHETAMINE SULFATE AND AMPHETAMINE SULFATE 6.25; 6.25; 6.25; 6.25 MG/1; MG/1; MG/1; MG/1
25 CAPSULE, EXTENDED RELEASE ORAL DAILY
Qty: 30 CAPSULE | Refills: 0 | Status: SHIPPED | OUTPATIENT
Start: 2025-04-24 | End: 2026-04-24

## 2025-04-10 RX ORDER — VORTIOXETINE 20 MG/1
1 TABLET, FILM COATED ORAL
Qty: 30 TABLET | Refills: 2 | Status: SHIPPED | OUTPATIENT
Start: 2025-04-10

## 2025-04-10 NOTE — PATIENT INSTRUCTIONS
1.  Please return to clinic at your next scheduled visit.  Please contact the clinic ([phone]) at least 24 hours prior in the event you need to cancel.  2.  Do no harm to yourself or others.    3.  Avoid alcohol and drugs.    4.  Take all medications as prescribed.  Please contact the clinic with any concerns. If you are in need of medication refills, please call the clinic at [phone].    5. Should you want to get in touch with your provider, DEJUAN Lam, please contact the office ([phone]), and staff will be able to page Bekah directly.  6. In the event you have personal crisis, contact the following crisis numbers: Suicide Prevention Hotline 1-602.553.5255; [facility] Helpline 0-985-921-[facility]FFC; [facility] Emergency Room [phone]; text HELLO to 547311; or 567.     SPECIFIC RECOMMENDATIONS:     1.      Medications discussed at this encounter:     New Medications Ordered This Visit   Medications    Vortioxetine HBr (Trintellix) 20 MG tablet     Sig: Take 1 tablet by mouth Daily With Breakfast.     Dispense:  30 tablet     Refill:  2    amphetamine-dextroamphetamine XR (ADDERALL XR) 25 MG 24 hr capsule     Sig: Take 1 capsule by mouth Daily     Dispense:  30 capsule     Refill:  0     Thx 4 all U do!                       2.      Psychotherapy recommendations: We will continue therapy at future visits.     3.     Return to clinic: Return in about 2 months (around 6/10/2025).

## 2025-04-10 NOTE — PROGRESS NOTES
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 suicidal planning.  Frequency of symptoms: most days (Recurrent thoughts about death. Denies plan/intent)  Severity: moderate  Sleep quality: good (Takes melatonin for sleep)  Denies suicidal ideation.  Denies AVH.  We will continue to monitor for mood, behavior, and safety.  Discontinue prozac  Start trintellix 5 mg daily  Start propanolol  Continue bupropion  mg twice daily  Continue Ambulatory referral for neuropsychological testing: ADHD  Follow-up 1 month    Record Review is below for 10/12/2023 : I have thoroughly reviewed the patient's electronic medical record to include previous encounters, care everywhere, notes, medications, labs, ADILIA and UDS (if applicable), imaging, and EKG's.  Pertinent information is included in this note.  5/22/2023 TSH 0.821, LDL cholesterol 110, lipid panel is otherwise reassuring, CBC and CMP are reassuring.  EKG Results:  None in record  Head Imaging:  None in record    08/18/2023   She is a strict planner for everything, worries about these plans.   She is concerned for this adversely affecting her personal and professional life.   Decreased focus, quick to anger.  We discussed neuropsychological testing: ADHD.  Childhood assessment is not compelling.  Has been taking Prozac 10 mg for a couple years.  Depression  Visit Type: initial (Anxiety)  Onset of symptoms: more than 5 years ago  Progression since onset: waxing  "and waning  Patient presents with the following symptoms: decreased concentration, excessive worry, fatigue, irritability, nervousness/anxiety, psychomotor agitation, restlessness and thoughts of death.  Patient is not experiencing: feelings of hopelessness, feelings of worthlessness, suicidal ideas and suicidal planning.  Frequency of symptoms: most days (Recurrent thoughts about death. Denies plan/intent)   Severity: moderate   Sleep quality: good (Takes melatonin for sleep)  Denies current suicidal ideation. Denies AVH.  Patient is 19 ROSIBEL-7 is 20.  Increase Prozac to 20 mg daily  Continue bupropion  mg twice daily  Ambulatory referral for neuropsychological testing: ADHD  Follow-up 1 month    ADHD:  Symptoms are persistent and significantly interfere with major life activities and/or result in significant suffering  With focus on K5 through 6th grade only   Grades: Always had good grades  Behavioral issues:\"goofing off\" distracting others  Special Classes:Gifted programs  Inattention:Yes  Referral for ADHD testing: Denies     Often fails to give close attention to details or makes careless mistakes in schoolwork, at work, or during other activities:Yes  Often has difficulty sustaining attention in tasks:Yes  Often has difficulty organizing tasks and activities:Yes  Often avoids, dislikes or is reluctant to engage in tasks that require sustained mental effort: No  Often loses things necessary for tasks or activities: No  Is often easily distracted by extraneous stimuli: Yes  Is often forgetful in daily activities: Yes  Hyperactivity and Impulsivity: No  Often fidgets with or taps hands or feet: Yes  Often feels restless: Yes  Is often “on the go”, acting as if “driven by a motor”: Yes  Often talks excessively: Yes  Often has difficulty waiting their turn: Yes  Often interrupts or intrudes on others: Yes    Record Review for 08/18/2023 : I have thoroughly reviewed the patient's electronic medical record to " include previous encounters, care everywhere, notes, medications, labs, ADILIA and UDS (if applicable), imaging, and EKG's.  Pertinent information is included in this note.  5/22/2023 TSH 0.821, LDL cholesterol 110, lipid panel is otherwise reassuring, CBC and CMP are reassuring.  EKG Results:  None in record  Head Imaging:  None in record      Per Referring Provider 7/20/2023 Malathi is here to be seen for a follow up. She is requesting to see behavioral health to have an evaluation and formally diagnose her. She has been told she has depression, anxiety, and adhd. She had previously been on focalin for adhd and is currently on wellbutrin and prozac for depression and anxiety     Past Psychiatric History:  Began Treatment: 2008  Diagnoses: Depression, anxiety, ADHD  Psychiatrist: Yes, previously  Therapist: Yes, previously  Admission History: Denies  Medication Trials: Straterra (high bp), wellbutrin SR, prozac, focalin, celexa, zoloft, lexapro, ativan, buspar, trintellix  Suicide Attempts: 2011, tried to OD on pills  Self Harm: Hx of cutting, denies any current self-harming  Substance use/abuse: Alcohol, on occasion  Withdrawal Symptoms: Not applicable  Longest Period Sober: Not applicable  AA: Not applicable  Trauma/Childhood/ACE: Lost one of her close friends unexpectedly, this was traumatic, average childhood    Safety/Risk Assessment: Risk of self-harm acutely and chronically is moderate to severe.    Static/Dynamic risk factors include diagnosis of mental disorder, psychosocial stressors,Previous self-harm, Previous suicide attempt, Recent stressor or loss, and Social factors.      MENTAL STATUS EXAM   General Appearance:  Cleanly groomed and dressed and well developed  Eye Contact:  Good eye contact  Attitude:  Cooperative and polite  Motor Activity:  Normal gait, posture  Speech:  Normal rate, tone, volume  Mood and affect:  Normal, pleasant and euthymic  Hopelessness:  Denies  Thought Process:  Logical and  goal-directed  Associations/ Thought Content:  No delusions  Hallucinations:  None  Suicidal Ideations:  Not present  Homicidal Ideation:  Not present  Sensorium:  Alert  Orientation:  Person, place, time and situation  Immediate Recall, Recent, and Remote Memory:  Intact  Attention Span/ Concentration:  Good  Fund of Knowledge:  Appropriate for age and educational level  Intellectual Functioning:  Average range  Insight:  Good  Judgement:  Good  Reliability:  Good  Impulse Control:  Good     PHQ-9 Depression Screening  PHQ-9 Total Score: 7    Little interest or pleasure in doing things? Not at all   Feeling down, depressed, or hopeless? Not at all   PHQ-2 Total Score 0   Trouble falling or staying asleep, or sleeping too much? Not at all   Feeling tired or having little energy? Over half   Poor appetite or overeating? Several days   Feeling bad about yourself - or that you are a failure or have let yourself or your family down? Not at all   Trouble concentrating on things, such as reading the newspaper or watching television? Almost all   Moving or speaking so slowly that other people could have noticed? Or the opposite - being so fidgety or restless that you have been moving around a lot more than usual? Several days   Thoughts that you would be better off dead, or of hurting yourself in some way? Not at all   PHQ-9 Total Score 7   If you checked off any problems, how difficult have these problems made it for you to do your work, take care of things at home, or get along with other people? Somewhat difficult       ROSIBEL-7  Feeling nervous, anxious or on edge: Several days  Not being able to stop or control worrying: Several days  Worrying too much about different things: Several days  Trouble Relaxing: More than half the days  Being so restless that it is hard to sit still: More than half the days  Feeling afraid as if something awful might happen: Several days  Becoming easily annoyed or irritable: Nearly every  day  ROSIBEL 7 Total Score: 11  If you checked any problems, how difficult have these problems made it for you to do your work, take care of things at home, or get along with other people: Somewhat difficult    Review of systems is negative except as noted in HPI.  Labs:  WBC   Date Value Ref Range Status   04/23/2024 9.86 3.40 - 10.80 10*3/mm3 Final     Platelets   Date Value Ref Range Status   04/23/2024 298 140 - 450 10*3/mm3 Final     Hemoglobin   Date Value Ref Range Status   04/23/2024 14.1 12.0 - 15.9 g/dL Final     Hematocrit   Date Value Ref Range Status   04/23/2024 43.0 34.0 - 46.6 % Final     Glucose   Date Value Ref Range Status   04/23/2024 96 65 - 99 mg/dL Final     Creatinine   Date Value Ref Range Status   04/23/2024 0.90 0.57 - 1.00 mg/dL Final     ALT (SGPT)   Date Value Ref Range Status   04/23/2024 13 1 - 33 U/L Final     AST (SGOT)   Date Value Ref Range Status   04/23/2024 18 1 - 32 U/L Final     BUN   Date Value Ref Range Status   04/23/2024 9 6 - 20 mg/dL Final     eGFR   Date Value Ref Range Status   04/23/2024 85.7 >60.0 mL/min/1.73 Final     Total Cholesterol   Date Value Ref Range Status   05/22/2023 187 0 - 200 mg/dL Final     Triglycerides   Date Value Ref Range Status   05/22/2023 60 0 - 150 mg/dL Final     HDL Cholesterol   Date Value Ref Range Status   05/22/2023 66 (H) 40 - 60 mg/dL Final     LDL Cholesterol    Date Value Ref Range Status   05/22/2023 110 (H) 0 - 100 mg/dL Final     VLDL Cholesterol   Date Value Ref Range Status   05/22/2023 11 5 - 40 mg/dL Final     LDL/HDL Ratio   Date Value Ref Range Status   05/22/2023 1.65  Final     TSH   Date Value Ref Range Status   05/22/2023 0.821 0.270 - 4.200 uIU/mL Final      Pain Management Panel  More data may exist         Latest Ref Rng & Units 7/11/2024 11/16/2023   Pain Management Panel   Amphetamine, Urine Qual Negative Positive  Negative    Barbiturates Screen, Urine Negative Negative  Negative    Benzodiazepine Screen, Urine  Negative Negative  Negative    Buprenorphine, Screen, Urine Negative Negative  -   Cocaine Screen, Urine Negative Negative  Negative    Methadone Screen , Urine Negative Negative  Negative    Methamphetamine, Ur Negative Negative  -      Imaging Results:  No Images in the past 120 days found..  Current Medications:   Current Outpatient Medications   Medication Sig Dispense Refill    albuterol sulfate  (90 Base) MCG/ACT inhaler Inhale 2 puffs Every 4 (Four) Hours As Needed for Wheezing.      amoxicillin-clavulanate (AUGMENTIN) 875-125 MG per tablet Take 1 tablet by mouth Every 12 (Twelve) Hours.      [START ON 4/24/2025] amphetamine-dextroamphetamine XR (ADDERALL XR) 25 MG 24 hr capsule Take 1 capsule by mouth Daily 30 capsule 0    azelastine (ASTELIN) 0.1 % nasal spray USE 1 TO 2 SPRAYS IN EACH NOSTRIL TWICE DAILY AS NEEDED      benzonatate (TESSALON) 100 MG capsule take 1 capsule by mouth three times a day for cough      cetirizine (zyrTEC) 10 MG tablet Take 1 tablet by mouth Daily.      famotidine (PEPCID) 20 MG tablet Take 1 tablet by mouth Daily.      fluticasone (FLONASE) 50 MCG/ACT nasal spray 1 spray Daily.      hydrocortisone 2.5 % cream Apply  topically to the appropriate area as directed 2 (Two) Times a Day. 20 g 0    levocetirizine (XYZAL) 5 MG tablet Take 1 tablet by mouth.      propranolol (INDERAL) 10 MG tablet TAKE 1 TABLET BY MOUTH TWICE DAILY AS NEEDED FOR ANXIETY OR PANIC ATTACKS 180 tablet 0    tacrolimus (PROTOPIC) 0.1 % ointment APPLY THIN LAYER TO EYELIDS TWICE DAILY      vitamin D3 125 MCG (5000 UT) capsule capsule Take 125 mcg by mouth Daily.      Vortioxetine HBr (Trintellix) 20 MG tablet Take 1 tablet by mouth Daily With Breakfast. 30 tablet 2     Current Facility-Administered Medications   Medication Dose Route Frequency Provider Last Rate Last Admin    Levonorgestrel (MIRENA) 20 MCG/DAY IUD intrauterine device 1 each  1 each Intrauterine Once Cleopatra Arteaga, DO         Problem  List:  Patient Active Problem List   Diagnosis    Gastroesophageal reflux disease    Allergic rhinitis    ROSIBEL (generalized anxiety disorder)    Depression    Vitamin D deficiency    Arthritis    Asthma    Concussion with loss of consciousness     Allergy:   Allergies   Allergen Reactions    Prednisone Shortness Of Breath     Oral steroids, reports does ok with injections      Discontinued Medications:  Medications Discontinued During This Encounter   Medication Reason    Vortioxetine HBr (Trintellix) 20 MG tablet Reorder    amphetamine-dextroamphetamine XR (ADDERALL XR) 25 MG 24 hr capsule Reorder       PLAN:   Presentation meets DSM-V criteria for:  Diagnoses and all orders for this visit:    1. Moderate episode of recurrent major depressive disorder (Primary)  -     Vortioxetine HBr (Trintellix) 20 MG tablet; Take 1 tablet by mouth Daily With Breakfast.  Dispense: 30 tablet; Refill: 2    2. Generalized anxiety disorder  -     Vortioxetine HBr (Trintellix) 20 MG tablet; Take 1 tablet by mouth Daily With Breakfast.  Dispense: 30 tablet; Refill: 2    3. Panic attacks    4. Attention deficit hyperactivity disorder (ADHD), predominantly inattentive type  -     amphetamine-dextroamphetamine XR (ADDERALL XR) 25 MG 24 hr capsule; Take 1 capsule by mouth Daily  Dispense: 30 capsule; Refill: 0       Continue Trintellix to 20 mg daily  Continue propanolol 10 mg twice daily as needed  Continue Adderall XR 25 mg daily  Follow-up 1 month  Medication Education:   INDERAL (PROPANOLOL) Risks, benefits, alternatives discussed with patient including dizziness, sedation, falls, low blood pressure, low heart rate, possible exacerbation of asthma.  After discussion of these risks and benefits, the patient voiced understanding and agreed to proceed.  TRINTELLIX (VORTIOXETINE) Risks, benefits, alternatives discussed with patient including nausea, vomiting, constipation, sexual dysfunction.  Onset of action is usually in 2 weeks.  After  discussion of these risks and benefits, the patient voiced understanding and agreed to proceed.  ADDERALL (AMPHETAMINE) Risks, benefits, side effects discussed with patient including elevated heart rate, elevated blood pressure, irritability, insomnia, sexual dysfunction, appetite suppressing properties, psychosis.  After discussion of these risks and benefits, the patient voiced understanding and agreed to proceed. Adilia reviewed, UDS ordered, and controlled substance agreement signed & witnessed.  Medications:   New Medications Ordered This Visit   Medications    Vortioxetine HBr (Trintellix) 20 MG tablet     Sig: Take 1 tablet by mouth Daily With Breakfast.     Dispense:  30 tablet     Refill:  2    amphetamine-dextroamphetamine XR (ADDERALL XR) 25 MG 24 hr capsule     Sig: Take 1 capsule by mouth Daily     Dispense:  30 capsule     Refill:  0     Thx 4 all U do!      ADILIA reviewed.   Discussed medication options and treatment plan of prescribed medication as well as the risks, benefits, and side effects.  Patient is agreeable to call the office with any worsening of symptoms or onset of side effects.   Patient is agreeable to call 911 or go to the nearest ER should he/she begin having SI/HI.   Patient acknowledged, is agreeable to continue with current treatment plan, and was educated on the importance of compliance with treatment and follow-up appointments.  Addressed all questions and concerns.     Psychotherapy:    Provided minimal supportive therapy.  Functional status: Moderate symptoms OR moderate difficulty in social occupational or social functioning (51-60)  Prognosis: Good chance of responding well to treatment   Progress: waxing and waning but better overall      Follow-up: Return in about 2 months (around 6/10/2025).     This document has been electronically signed by DEJUAN Lam  April 10, 2025 09:25 EDT  Please note that portions of this note were completed with a voice recognition  program.  Copied text in this note has been reviewed and is accurate as of 04/10/25

## 2025-05-05 DIAGNOSIS — F90.0 ATTENTION DEFICIT HYPERACTIVITY DISORDER (ADHD), PREDOMINANTLY INATTENTIVE TYPE: ICD-10-CM

## 2025-05-05 RX ORDER — DEXTROAMPHETAMINE SACCHARATE, AMPHETAMINE ASPARTATE MONOHYDRATE, DEXTROAMPHETAMINE SULFATE AND AMPHETAMINE SULFATE 6.25; 6.25; 6.25; 6.25 MG/1; MG/1; MG/1; MG/1
25 CAPSULE, EXTENDED RELEASE ORAL DAILY
Qty: 30 CAPSULE | Refills: 0 | Status: SHIPPED | OUTPATIENT
Start: 2025-05-05 | End: 2026-05-05

## 2025-05-05 NOTE — TELEPHONE ENCOUNTER
MEDICATION: amphetamine-dextroamphetamine XR (ADDERALL XR) 25 MG 24 hr capsule (04/24/2025)     NEXT OFFICE VISIT: Appointment with Bekah Chapman APRN (06/12/2025)     LAST OFFICE VISIT: Office Visit with Bekah Chapman APRN (04/10/2025)     NARC CONSENT: CONTROLLED SUBSTANCE AGREEMENT - SCAN - [facility], [facility], 11/16/2023 (11/16/2023)     URINE DRUG SCREEN(STANDING ORDER)   (BARB PRATT & MENA 1 PER YEAR): POC Medline 12 Panel Urine Drug Screen (07/11/2024 08:55)     PROVIDER PLEASE ADVISE

## 2025-05-06 ENCOUNTER — LAB (OUTPATIENT)
Facility: HOSPITAL | Age: 35
End: 2025-05-06
Payer: COMMERCIAL

## 2025-05-06 ENCOUNTER — RESULTS FOLLOW-UP (OUTPATIENT)
Facility: HOSPITAL | Age: 35
End: 2025-05-06
Payer: COMMERCIAL

## 2025-05-06 DIAGNOSIS — M79.10 MYALGIA: ICD-10-CM

## 2025-05-06 LAB
ALBUMIN SERPL-MCNC: 4.3 G/DL (ref 3.5–5.2)
ALBUMIN/GLOB SERPL: 2 G/DL
ALP SERPL-CCNC: 54 U/L (ref 39–117)
ALT SERPL W P-5'-P-CCNC: 18 U/L (ref 1–33)
ANION GAP SERPL CALCULATED.3IONS-SCNC: 6.4 MMOL/L (ref 5–15)
AST SERPL-CCNC: 20 U/L (ref 1–32)
BASOPHILS # BLD AUTO: 0.06 10*3/MM3 (ref 0–0.2)
BASOPHILS NFR BLD AUTO: 0.6 % (ref 0–1.5)
BILIRUB SERPL-MCNC: 0.7 MG/DL (ref 0–1.2)
BUN SERPL-MCNC: 12 MG/DL (ref 6–20)
BUN/CREAT SERPL: 13.5 (ref 7–25)
CALCIUM SPEC-SCNC: 9 MG/DL (ref 8.6–10.5)
CHLORIDE SERPL-SCNC: 106 MMOL/L (ref 98–107)
CO2 SERPL-SCNC: 26.6 MMOL/L (ref 22–29)
CREAT SERPL-MCNC: 0.89 MG/DL (ref 0.57–1)
DEPRECATED RDW RBC AUTO: 40.2 FL (ref 37–54)
EGFRCR SERPLBLD CKD-EPI 2021: 86.3 ML/MIN/1.73
EOSINOPHIL # BLD AUTO: 0.43 10*3/MM3 (ref 0–0.4)
EOSINOPHIL NFR BLD AUTO: 4.2 % (ref 0.3–6.2)
ERYTHROCYTE [DISTWIDTH] IN BLOOD BY AUTOMATED COUNT: 11.7 % (ref 12.3–15.4)
GLOBULIN UR ELPH-MCNC: 2.2 GM/DL
GLUCOSE SERPL-MCNC: 97 MG/DL (ref 65–99)
HCT VFR BLD AUTO: 40.4 % (ref 34–46.6)
HGB BLD-MCNC: 13.8 G/DL (ref 12–15.9)
IMM GRANULOCYTES # BLD AUTO: 0.04 10*3/MM3 (ref 0–0.05)
IMM GRANULOCYTES NFR BLD AUTO: 0.4 % (ref 0–0.5)
LYMPHOCYTES # BLD AUTO: 2.57 10*3/MM3 (ref 0.7–3.1)
LYMPHOCYTES NFR BLD AUTO: 25.1 % (ref 19.6–45.3)
MAGNESIUM SERPL-MCNC: 2 MG/DL (ref 1.6–2.6)
MCH RBC QN AUTO: 31.9 PG (ref 26.6–33)
MCHC RBC AUTO-ENTMCNC: 34.2 G/DL (ref 31.5–35.7)
MCV RBC AUTO: 93.5 FL (ref 79–97)
MONOCYTES # BLD AUTO: 0.77 10*3/MM3 (ref 0.1–0.9)
MONOCYTES NFR BLD AUTO: 7.5 % (ref 5–12)
NEUTROPHILS NFR BLD AUTO: 6.35 10*3/MM3 (ref 1.7–7)
NEUTROPHILS NFR BLD AUTO: 62.2 % (ref 42.7–76)
NRBC BLD AUTO-RTO: 0 /100 WBC (ref 0–0.2)
PLATELET # BLD AUTO: 304 10*3/MM3 (ref 140–450)
PMV BLD AUTO: 10.9 FL (ref 6–12)
POTASSIUM SERPL-SCNC: 4.3 MMOL/L (ref 3.5–5.2)
PROT SERPL-MCNC: 6.5 G/DL (ref 6–8.5)
RBC # BLD AUTO: 4.32 10*6/MM3 (ref 3.77–5.28)
SODIUM SERPL-SCNC: 139 MMOL/L (ref 136–145)
TSH SERPL DL<=0.05 MIU/L-ACNC: 0.67 UIU/ML (ref 0.27–4.2)
WBC NRBC COR # BLD AUTO: 10.22 10*3/MM3 (ref 3.4–10.8)

## 2025-05-06 PROCEDURE — 83735 ASSAY OF MAGNESIUM: CPT

## 2025-05-06 PROCEDURE — 80050 GENERAL HEALTH PANEL: CPT

## 2025-05-06 PROCEDURE — 36415 COLL VENOUS BLD VENIPUNCTURE: CPT

## 2025-05-06 NOTE — PROGRESS NOTES
I spoke with the patient.  Labs are unremarkable.  If she does not improve or some changes get reevaluated.  She voiced understanding.

## 2025-06-05 DIAGNOSIS — F90.0 ATTENTION DEFICIT HYPERACTIVITY DISORDER (ADHD), PREDOMINANTLY INATTENTIVE TYPE: ICD-10-CM

## 2025-06-05 RX ORDER — DEXTROAMPHETAMINE SACCHARATE, AMPHETAMINE ASPARTATE MONOHYDRATE, DEXTROAMPHETAMINE SULFATE AND AMPHETAMINE SULFATE 6.25; 6.25; 6.25; 6.25 MG/1; MG/1; MG/1; MG/1
25 CAPSULE, EXTENDED RELEASE ORAL DAILY
Qty: 30 CAPSULE | Refills: 0 | Status: SHIPPED | OUTPATIENT
Start: 2025-06-05 | End: 2026-06-05

## 2025-06-05 NOTE — TELEPHONE ENCOUNTER
amphetamine-dextroamphetamine XR (ADDERALL XR) 25 MG 24 hr capsule       Last ordered: 1 month ago (5/5/2025) by DEJUAN Lam     FOLLOW UP 06/12/2025

## 2025-06-12 ENCOUNTER — OFFICE VISIT (OUTPATIENT)
Dept: BEHAVIORAL HEALTH | Facility: CLINIC | Age: 35
End: 2025-06-12
Payer: COMMERCIAL

## 2025-06-12 VITALS
HEART RATE: 88 BPM | DIASTOLIC BLOOD PRESSURE: 71 MMHG | WEIGHT: 185 LBS | SYSTOLIC BLOOD PRESSURE: 117 MMHG | BODY MASS INDEX: 27.4 KG/M2 | HEIGHT: 69 IN

## 2025-06-12 DIAGNOSIS — F33.1 MODERATE EPISODE OF RECURRENT MAJOR DEPRESSIVE DISORDER: ICD-10-CM

## 2025-06-12 DIAGNOSIS — F41.0 PANIC ATTACKS: ICD-10-CM

## 2025-06-12 DIAGNOSIS — F90.0 ATTENTION DEFICIT HYPERACTIVITY DISORDER (ADHD), PREDOMINANTLY INATTENTIVE TYPE: Primary | ICD-10-CM

## 2025-06-12 DIAGNOSIS — F41.1 GENERALIZED ANXIETY DISORDER: ICD-10-CM

## 2025-06-12 NOTE — PROGRESS NOTES
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   Depression and Anxiety  Patient presents with the following symptoms: decreased concentration, excessive worry, fatigue, irritability, nervousness/anxiety, psychomotor agitation, restlessness and thoughts of death.  Patient is not experiencing: feelings of hopelessness, feelings of worthlessness, suicidal ideas and suicidal planning.  Frequency of symptoms: most days (Recurrent thoughts about death. Denies plan/intent)  Severity: moderate  Sleep quality: good (Takes melatonin for sleep)  Denies suicidal ideation.  Denies AVH.  We will continue to monitor for mood, behavior, and safety.  Increase Trintellix to 10 mg daily  Start propanolol 10 mg twice daily as needed  Continue bupropion  mg twice daily  Continue Ambulatory referral for neuropsychological testing: ADHD  Follow-up 1 month    Record Review is below for 11/10/2023 : I have thoroughly reviewed the patient's electronic medical record to include previous encounters, care everywhere, notes, medications, labs, ADILIA and UDS (if applicable), imaging, and EKG's.  Pertinent information is included in this note.  5/22/2023 TSH 0.821, LDL cholesterol 110, lipid panel is otherwise reassuring, CBC and CMP are reassuring.  EKG Results:  None in record  Head Imaging:  None in record    10/12/2023 Patient is taking medications as prescribed and is tolerating them well.   \"I am doing okay\" " She doesn't really think that the prozac is helping her, even at increased dose.   She is starting to have panic attacks, difficulty breathing and talking. She started having nocturnal panic.   She is still working on setting up appointment for neuropsychological testing.   Depression and Anxiety  Patient presents with the following symptoms: decreased concentration, excessive worry, fatigue, irritability, nervousness/anxiety, psychomotor agitation, restlessness and thoughts of death.  Patient is not experiencing: feelings of hopelessness, feelings of worthlessness, suicidal ideas and suicidal planning.  Frequency of symptoms: most days (Recurrent thoughts about death. Denies plan/intent)  Severity: moderate  Sleep quality: good (Takes melatonin for sleep)  Denies suicidal ideation.  Denies AVH.  We will continue to monitor for mood, behavior, and safety.  Discontinue prozac  Start trintellix 5 mg daily  Start propanolol  Continue bupropion  mg twice daily  Continue Ambulatory referral for neuropsychological testing: ADHD  Follow-up 1 month    Record Review is below for 10/12/2023 : I have thoroughly reviewed the patient's electronic medical record to include previous encounters, care everywhere, notes, medications, labs, ADILIA and UDS (if applicable), imaging, and EKG's.  Pertinent information is included in this note.  5/22/2023 TSH 0.821, LDL cholesterol 110, lipid panel is otherwise reassuring, CBC and CMP are reassuring.  EKG Results:  None in record  Head Imaging:  None in record    08/18/2023   She is a strict planner for everything, worries about these plans.   She is concerned for this adversely affecting her personal and professional life.   Decreased focus, quick to anger.  We discussed neuropsychological testing: ADHD.  Childhood assessment is not compelling.  Has been taking Prozac 10 mg for a couple years.  Depression  Visit Type: initial (Anxiety)  Onset of symptoms: more than 5 years  "ago  Progression since onset: waxing and waning  Patient presents with the following symptoms: decreased concentration, excessive worry, fatigue, irritability, nervousness/anxiety, psychomotor agitation, restlessness and thoughts of death.  Patient is not experiencing: feelings of hopelessness, feelings of worthlessness, suicidal ideas and suicidal planning.  Frequency of symptoms: most days (Recurrent thoughts about death. Denies plan/intent)   Severity: moderate   Sleep quality: good (Takes melatonin for sleep)  Denies current suicidal ideation. Denies AVH.  Patient is 19 ROSIBEL-7 is 20.  Increase Prozac to 20 mg daily  Continue bupropion  mg twice daily  Ambulatory referral for neuropsychological testing: ADHD  Follow-up 1 month    ADHD:  Symptoms are persistent and significantly interfere with major life activities and/or result in significant suffering  With focus on K5 through 6th grade only   Grades: Always had good grades  Behavioral issues:\"goofing off\" distracting others  Special Classes:Gifted programs  Inattention:Yes  Referral for ADHD testing: Denies     Often fails to give close attention to details or makes careless mistakes in schoolwork, at work, or during other activities:Yes  Often has difficulty sustaining attention in tasks:Yes  Often has difficulty organizing tasks and activities:Yes  Often avoids, dislikes or is reluctant to engage in tasks that require sustained mental effort: No  Often loses things necessary for tasks or activities: No  Is often easily distracted by extraneous stimuli: Yes  Is often forgetful in daily activities: Yes  Hyperactivity and Impulsivity: No  Often fidgets with or taps hands or feet: Yes  Often feels restless: Yes  Is often “on the go”, acting as if “driven by a motor”: Yes  Often talks excessively: Yes  Often has difficulty waiting their turn: Yes  Often interrupts or intrudes on others: Yes    Record Review for 08/18/2023 : I have thoroughly reviewed the " patient's electronic medical record to include previous encounters, care everywhere, notes, medications, labs, ADILIA and UDS (if applicable), imaging, and EKG's.  Pertinent information is included in this note.  5/22/2023 TSH 0.821, LDL cholesterol 110, lipid panel is otherwise reassuring, CBC and CMP are reassuring.  EKG Results:  None in record  Head Imaging:  None in record      Per Referring Provider 7/20/2023 Malathi is here to be seen for a follow up. She is requesting to see behavioral health to have an evaluation and formally diagnose her. She has been told she has depression, anxiety, and adhd. She had previously been on focalin for adhd and is currently on wellbutrin and prozac for depression and anxiety     Past Psychiatric History:  Began Treatment: 2008  Diagnoses: Depression, anxiety, ADHD  Psychiatrist: Yes, previously  Therapist: Yes, previously  Admission History: Denies  Medication Trials: Straterra (high bp), wellbutrin SR, prozac, focalin, celexa, zoloft, lexapro, ativan, buspar, trintellix  Suicide Attempts: 2011, tried to OD on pills  Self Harm: Hx of cutting, denies any current self-harming  Substance use/abuse: Alcohol, on occasion  Withdrawal Symptoms: Not applicable  Longest Period Sober: Not applicable  AA: Not applicable  Trauma/Childhood/ACE: Lost one of her close friends unexpectedly, this was traumatic, average childhood    Safety/Risk Assessment: Risk of self-harm acutely and chronically is waxing and waning but better overall.    Static/Dynamic risk factors include diagnosis of mental disorder, psychosocial stressors,Recent stressor or loss and Social factors.      MENTAL STATUS EXAM   General Appearance:  Cleanly groomed and dressed and well developed  Eye Contact:  Good eye contact  Attitude:  Cooperative and polite  Motor Activity:  Normal gait, posture  Speech:  Normal rate, tone, volume  Mood and affect:  Normal, pleasant and euthymic  Hopelessness:  Denies  Thought Process:   Logical and goal-directed  Associations/ Thought Content:  No delusions  Hallucinations:  None  Suicidal Ideations:  Not present  Homicidal Ideation:  Not present  Sensorium:  Alert  Orientation:  Person, place, time and situation  Immediate Recall, Recent, and Remote Memory:  Intact  Attention Span/ Concentration:  Good  Fund of Knowledge:  Appropriate for age and educational level  Intellectual Functioning:  Average range  Insight:  Good  Judgement:  Good  Reliability:  Good  Impulse Control:  Good       Review of systems is negative except as noted in HPI.  Labs:  WBC   Date Value Ref Range Status   05/06/2025 10.22 3.40 - 10.80 10*3/mm3 Final     Platelets   Date Value Ref Range Status   05/06/2025 304 140 - 450 10*3/mm3 Final     Hemoglobin   Date Value Ref Range Status   05/06/2025 13.8 12.0 - 15.9 g/dL Final     Hematocrit   Date Value Ref Range Status   05/06/2025 40.4 34.0 - 46.6 % Final     Glucose   Date Value Ref Range Status   05/06/2025 97 65 - 99 mg/dL Final     Creatinine   Date Value Ref Range Status   05/06/2025 0.89 0.57 - 1.00 mg/dL Final     ALT (SGPT)   Date Value Ref Range Status   05/06/2025 18 1 - 33 U/L Final     AST (SGOT)   Date Value Ref Range Status   05/06/2025 20 1 - 32 U/L Final     BUN   Date Value Ref Range Status   05/06/2025 12 6 - 20 mg/dL Final     eGFR   Date Value Ref Range Status   05/06/2025 86.3 >60.0 mL/min/1.73 Final     Total Cholesterol   Date Value Ref Range Status   05/22/2023 187 0 - 200 mg/dL Final     Triglycerides   Date Value Ref Range Status   05/22/2023 60 0 - 150 mg/dL Final     HDL Cholesterol   Date Value Ref Range Status   05/22/2023 66 (H) 40 - 60 mg/dL Final     LDL Cholesterol    Date Value Ref Range Status   05/22/2023 110 (H) 0 - 100 mg/dL Final     VLDL Cholesterol   Date Value Ref Range Status   05/22/2023 11 5 - 40 mg/dL Final     LDL/HDL Ratio   Date Value Ref Range Status   05/22/2023 1.65  Final     TSH   Date Value Ref Range Status   05/06/2025  0.670 0.270 - 4.200 uIU/mL Final      Pain Management Panel  More data may exist         Latest Ref Rng & Units 7/11/2024 11/16/2023   Pain Management Panel   Amphetamine, Urine Qual Negative Positive  Negative    Barbiturates Screen, Urine Negative Negative  Negative    Benzodiazepine Screen, Urine Negative Negative  Negative    Buprenorphine, Screen, Urine Negative Negative  -   Cocaine Screen, Urine Negative Negative  Negative    Methadone Screen , Urine Negative Negative  Negative    Methamphetamine, Ur Negative Negative  -      Imaging Results:  No Images in the past 120 days found..  Current Medications:   Current Outpatient Medications   Medication Sig Dispense Refill    albuterol sulfate  (90 Base) MCG/ACT inhaler Inhale 2 puffs Every 4 (Four) Hours As Needed for Wheezing.      amphetamine-dextroamphetamine XR (ADDERALL XR) 25 MG 24 hr capsule Take 1 capsule by mouth Daily 30 capsule 0    azelastine (ASTELIN) 0.1 % nasal spray USE 1 TO 2 SPRAYS IN EACH NOSTRIL TWICE DAILY AS NEEDED      benzonatate (TESSALON) 100 MG capsule take 1 capsule by mouth three times a day for cough      cetirizine (zyrTEC) 10 MG tablet Take 1 tablet by mouth Daily.      famotidine (PEPCID) 20 MG tablet Take 1 tablet by mouth Daily.      fluticasone (FLONASE) 50 MCG/ACT nasal spray 1 spray Daily.      hydrocortisone 2.5 % cream Apply  topically to the appropriate area as directed 2 (Two) Times a Day. 20 g 0    levocetirizine (XYZAL) 5 MG tablet Take 1 tablet by mouth.      propranolol (INDERAL) 10 MG tablet TAKE 1 TABLET BY MOUTH TWICE DAILY AS NEEDED FOR ANXIETY OR PANIC ATTACKS 180 tablet 0    tacrolimus (PROTOPIC) 0.1 % ointment APPLY THIN LAYER TO EYELIDS TWICE DAILY      vitamin D3 125 MCG (5000 UT) capsule capsule Take 125 mcg by mouth Daily.      Vortioxetine HBr (Trintellix) 20 MG tablet Take 1 tablet by mouth Daily With Breakfast. 30 tablet 2    amoxicillin-clavulanate (AUGMENTIN) 875-125 MG per tablet Take 1 tablet  by mouth Every 12 (Twelve) Hours.       Current Facility-Administered Medications   Medication Dose Route Frequency Provider Last Rate Last Admin    Levonorgestrel (MIRENA) 20 MCG/DAY IUD intrauterine device 1 each  1 each Intrauterine Once Cleoptara Arteaga DO         Problem List:  Patient Active Problem List   Diagnosis    Gastroesophageal reflux disease    Allergic rhinitis    ROSIBEL (generalized anxiety disorder)    Depression    Vitamin D deficiency    Arthritis    Asthma    Concussion with loss of consciousness     Allergy:   No Known Allergies   Discontinued Medications:  There are no discontinued medications.    PLAN:   Presentation meets DSM-V criteria for:  Diagnoses and all orders for this visit:    1. Attention deficit hyperactivity disorder (ADHD), predominantly inattentive type (Primary)    2. Moderate episode of recurrent major depressive disorder    3. Generalized anxiety disorder    4. Panic attacks       Continue Trintellix to 20 mg daily  Continue propanolol 10 mg twice daily as needed  Continue Adderall XR 25 mg daily  Medication Education:   INDERAL (PROPANOLOL) Risks, benefits, alternatives discussed with patient including dizziness, sedation, falls, low blood pressure, low heart rate, possible exacerbation of asthma.  After discussion of these risks and benefits, the patient voiced understanding and agreed to proceed.  TRINTELLIX (VORTIOXETINE) Risks, benefits, alternatives discussed with patient including nausea, vomiting, constipation, sexual dysfunction.  Onset of action is usually in 2 weeks.  After discussion of these risks and benefits, the patient voiced understanding and agreed to proceed.  ADDERALL (AMPHETAMINE) Risks, benefits, side effects discussed with patient including elevated heart rate, elevated blood pressure, irritability, insomnia, sexual dysfunction, appetite suppressing properties, psychosis.  After discussion of these risks and benefits, the patient voiced understanding  and agreed to proceed. Adilia reviewed, UDS ordered, and controlled substance agreement signed & witnessed.  Medications: No orders of the defined types were placed in this encounter.     ADILIA reviewed.   Discussed medication options and treatment plan of prescribed medication as well as the risks, benefits, and side effects.  Patient is agreeable to call the office with any worsening of symptoms or onset of side effects.   Patient is agreeable to call 911 or go to the nearest ER should he/she begin having SI/HI.   Patient acknowledged, is agreeable to continue with current treatment plan, and was educated on the importance of compliance with treatment and follow-up appointments.  Addressed all questions and concerns.     Psychotherapy:    Provided minimal supportive therapy.  Functional status: Some mild symptoms or some difficulty in social, occupational, or school functioning, but generally functioning pretty well, has some meaningful interpersonal relationships (61-78)  Prognosis: Good chance of responding well to treatment   Progress: waxing and waning but better overall      Follow-up: Return in about 3 months (around 9/12/2025).     This document has been electronically signed by DEJUAN Lam  June 12, 2025 09:21 EDT  Please note that portions of this note were completed with a voice recognition program.  Copied text in this note has been reviewed and is accurate as of 06/12/25

## 2025-06-12 NOTE — PATIENT INSTRUCTIONS
1.  Please return to clinic at your next scheduled visit.  Please contact the clinic ([phone]) at least 24 hours prior in the event you need to cancel.  2.  Do no harm to yourself or others.    3.  Avoid alcohol and drugs.    4.  Take all medications as prescribed.  Please contact the clinic with any concerns. If you are in need of medication refills, please call the clinic at [phone].    5. Should you want to get in touch with your provider, DEJUAN Lam, please contact the office ([phone]), and staff will be able to page Bekah directly.  6. In the event you have personal crisis, contact the following crisis numbers: Suicide Prevention Hotline 1-178.421.9266; [facility] Helpline 5-410-642-UOUP; [facility] Emergency Room [phone]; text HELLO to 904363; or 808.     SPECIFIC RECOMMENDATIONS:     1.      Medications discussed at this encounter:     No orders of the defined types were placed in this encounter.                      2.      Psychotherapy recommendations: We will continue therapy at future visits.     3.     Return to clinic: Return in about 3 months (around 9/12/2025).

## 2025-07-07 DIAGNOSIS — F41.1 GENERALIZED ANXIETY DISORDER: ICD-10-CM

## 2025-07-07 DIAGNOSIS — F33.1 MODERATE EPISODE OF RECURRENT MAJOR DEPRESSIVE DISORDER: ICD-10-CM

## 2025-07-07 DIAGNOSIS — F90.0 ATTENTION DEFICIT HYPERACTIVITY DISORDER (ADHD), PREDOMINANTLY INATTENTIVE TYPE: ICD-10-CM

## 2025-07-07 NOTE — TELEPHONE ENCOUNTER
REFILL REQUEST:    Vortioxetine HBr (Trintellix) 20 MG tablet (04/10/2025)     amphetamine-dextroamphetamine XR (ADDERALL XR) 25 MG 24 hr capsule (06/05/2025)     F/UP: 09/11/2025.  LOV: 06/12/2025.    LAST UDS:  POC Medline 12 Panel Urine Drug Screen (07/11/2024 08:55)     LAST CSA  CONTROLLED SUBSTANCE AGREEMENT - SCAN - CSA, [facility], 11/16/2023 (11/16/2023)     PT NEEDS NEW CSA. NOTE ADDED TO NEXT APPT NOTES.

## 2025-07-08 RX ORDER — DEXTROAMPHETAMINE SACCHARATE, AMPHETAMINE ASPARTATE MONOHYDRATE, DEXTROAMPHETAMINE SULFATE AND AMPHETAMINE SULFATE 6.25; 6.25; 6.25; 6.25 MG/1; MG/1; MG/1; MG/1
25 CAPSULE, EXTENDED RELEASE ORAL DAILY
Qty: 30 CAPSULE | Refills: 0 | Status: SHIPPED | OUTPATIENT
Start: 2025-07-08 | End: 2026-07-08

## 2025-07-08 RX ORDER — VORTIOXETINE 20 MG/1
1 TABLET, FILM COATED ORAL
Qty: 30 TABLET | Refills: 2 | Status: SHIPPED | OUTPATIENT
Start: 2025-07-08

## 2025-08-25 DIAGNOSIS — F90.0 ATTENTION DEFICIT HYPERACTIVITY DISORDER (ADHD), PREDOMINANTLY INATTENTIVE TYPE: ICD-10-CM

## 2025-08-26 DIAGNOSIS — R10.9 ABDOMINAL DISCOMFORT: Primary | ICD-10-CM

## 2025-08-26 RX ORDER — DEXTROAMPHETAMINE SACCHARATE, AMPHETAMINE ASPARTATE MONOHYDRATE, DEXTROAMPHETAMINE SULFATE AND AMPHETAMINE SULFATE 6.25; 6.25; 6.25; 6.25 MG/1; MG/1; MG/1; MG/1
25 CAPSULE, EXTENDED RELEASE ORAL DAILY
Qty: 30 CAPSULE | Refills: 0 | Status: SHIPPED | OUTPATIENT
Start: 2025-08-26 | End: 2026-08-26